# Patient Record
Sex: FEMALE | Race: WHITE | Employment: OTHER | URBAN - METROPOLITAN AREA
[De-identification: names, ages, dates, MRNs, and addresses within clinical notes are randomized per-mention and may not be internally consistent; named-entity substitution may affect disease eponyms.]

---

## 2017-06-06 ENCOUNTER — HOSPITAL ENCOUNTER (OUTPATIENT)
Dept: WOMENS IMAGING | Age: 77
Discharge: HOME | End: 2017-06-06
Attending: FAMILY MEDICINE | Admitting: FAMILY MEDICINE

## 2017-06-06 DIAGNOSIS — Z12.39 SCREENING BREAST EXAMINATION: ICD-10-CM

## 2018-05-16 ENCOUNTER — HOSPITAL ENCOUNTER (OUTPATIENT)
Dept: GENERAL RADIOLOGY | Age: 78
Discharge: HOME | End: 2018-05-16
Attending: FAMILY MEDICINE | Admitting: FAMILY MEDICINE

## 2018-05-16 DIAGNOSIS — M54.6 THORACIC BACK PAIN, UNSPECIFIED BACK PAIN LATERALITY, UNSPECIFIED CHRONICITY: ICD-10-CM

## 2018-10-09 ENCOUNTER — APPOINTMENT (OUTPATIENT)
Dept: GENERAL RADIOLOGY | Age: 78
End: 2018-10-09
Payer: COMMERCIAL

## 2018-10-09 ENCOUNTER — HOSPITAL ENCOUNTER (OUTPATIENT)
Age: 78
Setting detail: OBSERVATION
Discharge: NURSING, REHAB OR LONG-TERM CARE | End: 2018-10-11
Attending: EMERGENCY MEDICINE | Admitting: INTERNAL MEDICINE
Payer: COMMERCIAL

## 2018-10-09 ENCOUNTER — APPOINTMENT (OUTPATIENT)
Dept: CT IMAGING | Age: 78
End: 2018-10-09
Payer: COMMERCIAL

## 2018-10-09 DIAGNOSIS — R07.9 CHEST PAIN, UNSPECIFIED TYPE: Primary | ICD-10-CM

## 2018-10-09 DIAGNOSIS — M54.50 CHRONIC MIDLINE LOW BACK PAIN WITHOUT SCIATICA: ICD-10-CM

## 2018-10-09 DIAGNOSIS — G89.29 CHRONIC MIDLINE LOW BACK PAIN WITHOUT SCIATICA: ICD-10-CM

## 2018-10-09 LAB
ALBUMIN SERPL-MCNC: 3.8 GM/DL (ref 3.4–5)
ALP BLD-CCNC: 103 IU/L (ref 40–129)
ALT SERPL-CCNC: 13 U/L (ref 10–40)
ANION GAP SERPL CALCULATED.3IONS-SCNC: 11 MMOL/L (ref 4–16)
AST SERPL-CCNC: 18 IU/L (ref 15–37)
BASOPHILS ABSOLUTE: 0 K/CU MM
BASOPHILS RELATIVE PERCENT: 0.5 % (ref 0–1)
BILIRUB SERPL-MCNC: 0.4 MG/DL (ref 0–1)
BUN BLDV-MCNC: 17 MG/DL (ref 6–23)
CALCIUM SERPL-MCNC: 9.4 MG/DL (ref 8.3–10.6)
CHLORIDE BLD-SCNC: 104 MMOL/L (ref 99–110)
CO2: 26 MMOL/L (ref 21–32)
CREAT SERPL-MCNC: 0.7 MG/DL (ref 0.6–1.1)
DIFFERENTIAL TYPE: ABNORMAL
EOSINOPHILS ABSOLUTE: 0.1 K/CU MM
EOSINOPHILS RELATIVE PERCENT: 2.3 % (ref 0–3)
GFR AFRICAN AMERICAN: >60 ML/MIN/1.73M2
GFR NON-AFRICAN AMERICAN: >60 ML/MIN/1.73M2
GLUCOSE BLD-MCNC: 159 MG/DL (ref 70–99)
HCT VFR BLD CALC: 41.9 % (ref 37–47)
HEMOGLOBIN: 13.5 GM/DL (ref 12.5–16)
IMMATURE NEUTROPHIL %: 0.3 % (ref 0–0.43)
LYMPHOCYTES ABSOLUTE: 2.4 K/CU MM
LYMPHOCYTES RELATIVE PERCENT: 39 % (ref 24–44)
MCH RBC QN AUTO: 30.5 PG (ref 27–31)
MCHC RBC AUTO-ENTMCNC: 32.2 % (ref 32–36)
MCV RBC AUTO: 94.6 FL (ref 78–100)
MONOCYTES ABSOLUTE: 0.8 K/CU MM
MONOCYTES RELATIVE PERCENT: 12.7 % (ref 0–4)
NUCLEATED RBC %: 0 %
PDW BLD-RTO: 12.9 % (ref 11.7–14.9)
PLATELET # BLD: 208 K/CU MM (ref 140–440)
PMV BLD AUTO: 9.9 FL (ref 7.5–11.1)
POTASSIUM SERPL-SCNC: 3.6 MMOL/L (ref 3.5–5.1)
PRO-BNP: 150.4 PG/ML
RBC # BLD: 4.43 M/CU MM (ref 4.2–5.4)
SEGMENTED NEUTROPHILS ABSOLUTE COUNT: 2.7 K/CU MM
SEGMENTED NEUTROPHILS RELATIVE PERCENT: 45.2 % (ref 36–66)
SODIUM BLD-SCNC: 141 MMOL/L (ref 135–145)
TOTAL IMMATURE NEUTOROPHIL: 0.02 K/CU MM
TOTAL NUCLEATED RBC: 0 K/CU MM
TOTAL PROTEIN: 6.8 GM/DL (ref 6.4–8.2)
TROPONIN T: <0.01 NG/ML
WBC # BLD: 6.1 K/CU MM (ref 4–10.5)

## 2018-10-09 PROCEDURE — 6370000000 HC RX 637 (ALT 250 FOR IP): Performed by: EMERGENCY MEDICINE

## 2018-10-09 PROCEDURE — 96365 THER/PROPH/DIAG IV INF INIT: CPT

## 2018-10-09 PROCEDURE — 80053 COMPREHEN METABOLIC PANEL: CPT

## 2018-10-09 PROCEDURE — G0378 HOSPITAL OBSERVATION PER HR: HCPCS

## 2018-10-09 PROCEDURE — 99285 EMERGENCY DEPT VISIT HI MDM: CPT

## 2018-10-09 PROCEDURE — 71275 CT ANGIOGRAPHY CHEST: CPT

## 2018-10-09 PROCEDURE — 96372 THER/PROPH/DIAG INJ SC/IM: CPT

## 2018-10-09 PROCEDURE — 36415 COLL VENOUS BLD VENIPUNCTURE: CPT

## 2018-10-09 PROCEDURE — 2580000003 HC RX 258: Performed by: EMERGENCY MEDICINE

## 2018-10-09 PROCEDURE — 2580000003 HC RX 258: Performed by: INTERNAL MEDICINE

## 2018-10-09 PROCEDURE — 93010 ELECTROCARDIOGRAM REPORT: CPT | Performed by: INTERNAL MEDICINE

## 2018-10-09 PROCEDURE — 6370000000 HC RX 637 (ALT 250 FOR IP): Performed by: PHYSICIAN ASSISTANT

## 2018-10-09 PROCEDURE — 93005 ELECTROCARDIOGRAM TRACING: CPT | Performed by: PHYSICIAN ASSISTANT

## 2018-10-09 PROCEDURE — 85025 COMPLETE CBC W/AUTO DIFF WBC: CPT

## 2018-10-09 PROCEDURE — 71045 X-RAY EXAM CHEST 1 VIEW: CPT

## 2018-10-09 PROCEDURE — 84484 ASSAY OF TROPONIN QUANT: CPT

## 2018-10-09 PROCEDURE — 6360000002 HC RX W HCPCS: Performed by: INTERNAL MEDICINE

## 2018-10-09 PROCEDURE — 6370000000 HC RX 637 (ALT 250 FOR IP): Performed by: INTERNAL MEDICINE

## 2018-10-09 PROCEDURE — 6370000000 HC RX 637 (ALT 250 FOR IP): Performed by: HOSPITALIST

## 2018-10-09 PROCEDURE — 83880 ASSAY OF NATRIURETIC PEPTIDE: CPT

## 2018-10-09 PROCEDURE — 6360000004 HC RX CONTRAST MEDICATION: Performed by: EMERGENCY MEDICINE

## 2018-10-09 RX ORDER — OXYCODONE HYDROCHLORIDE AND ACETAMINOPHEN 5; 325 MG/1; MG/1
2 TABLET ORAL ONCE
Status: COMPLETED | OUTPATIENT
Start: 2018-10-09 | End: 2018-10-09

## 2018-10-09 RX ORDER — PRAVASTATIN SODIUM 20 MG
20 TABLET ORAL NIGHTLY
Status: DISCONTINUED | OUTPATIENT
Start: 2018-10-10 | End: 2018-10-11 | Stop reason: HOSPADM

## 2018-10-09 RX ORDER — OXYCODONE HYDROCHLORIDE AND ACETAMINOPHEN 5; 325 MG/1; MG/1
2 TABLET ORAL 4 TIMES DAILY PRN
Status: DISCONTINUED | OUTPATIENT
Start: 2018-10-09 | End: 2018-10-11 | Stop reason: HOSPADM

## 2018-10-09 RX ORDER — OMEPRAZOLE 40 MG/1
40 CAPSULE, DELAYED RELEASE ORAL 2 TIMES DAILY
COMMUNITY

## 2018-10-09 RX ORDER — TRIAMTERENE AND HYDROCHLOROTHIAZIDE 37.5; 25 MG/1; MG/1
1 TABLET ORAL DAILY
Status: DISCONTINUED | OUTPATIENT
Start: 2018-10-10 | End: 2018-10-11 | Stop reason: HOSPADM

## 2018-10-09 RX ORDER — OXYCODONE AND ACETAMINOPHEN 10; 325 MG/1; MG/1
1 TABLET ORAL 4 TIMES DAILY
Status: ON HOLD | COMMUNITY
End: 2018-10-11

## 2018-10-09 RX ORDER — SODIUM CHLORIDE 9 MG/ML
INJECTION, SOLUTION INTRAVENOUS CONTINUOUS
Status: DISCONTINUED | OUTPATIENT
Start: 2018-10-09 | End: 2018-10-10

## 2018-10-09 RX ORDER — 0.9 % SODIUM CHLORIDE 0.9 %
700 INTRAVENOUS SOLUTION INTRAVENOUS ONCE
Status: DISCONTINUED | OUTPATIENT
Start: 2018-10-09 | End: 2018-10-09

## 2018-10-09 RX ORDER — GABAPENTIN 300 MG/1
300 CAPSULE ORAL 3 TIMES DAILY
Status: DISCONTINUED | OUTPATIENT
Start: 2018-10-09 | End: 2018-10-11 | Stop reason: HOSPADM

## 2018-10-09 RX ORDER — AMLODIPINE BESYLATE 5 MG/1
5 TABLET ORAL DAILY
Status: DISCONTINUED | OUTPATIENT
Start: 2018-10-10 | End: 2018-10-11 | Stop reason: HOSPADM

## 2018-10-09 RX ORDER — OMEGA-3S/DHA/EPA/FISH OIL/D3 300MG-1000
400 CAPSULE ORAL DAILY
COMMUNITY
End: 2022-03-23

## 2018-10-09 RX ORDER — PANTOPRAZOLE SODIUM 40 MG/1
40 TABLET, DELAYED RELEASE ORAL
Status: DISCONTINUED | OUTPATIENT
Start: 2018-10-10 | End: 2018-10-11 | Stop reason: HOSPADM

## 2018-10-09 RX ORDER — LEVOTHYROXINE SODIUM 88 UG/1
88 TABLET ORAL DAILY
Status: DISCONTINUED | OUTPATIENT
Start: 2018-10-10 | End: 2018-10-11 | Stop reason: HOSPADM

## 2018-10-09 RX ORDER — LISINOPRIL 40 MG/1
40 TABLET ORAL DAILY
Status: DISCONTINUED | OUTPATIENT
Start: 2018-10-10 | End: 2018-10-11 | Stop reason: HOSPADM

## 2018-10-09 RX ORDER — ASPIRIN 81 MG/1
324 TABLET, CHEWABLE ORAL ONCE
Status: COMPLETED | OUTPATIENT
Start: 2018-10-09 | End: 2018-10-09

## 2018-10-09 RX ORDER — OMEGA-3S/DHA/EPA/FISH OIL/D3 300MG-1000
400 CAPSULE ORAL DAILY
Status: DISCONTINUED | OUTPATIENT
Start: 2018-10-10 | End: 2018-10-11 | Stop reason: HOSPADM

## 2018-10-09 RX ORDER — 0.9 % SODIUM CHLORIDE 0.9 %
10 VIAL (ML) INJECTION
Status: COMPLETED | OUTPATIENT
Start: 2018-10-09 | End: 2018-10-09

## 2018-10-09 RX ORDER — ZOLPIDEM TARTRATE 5 MG/1
5 TABLET ORAL NIGHTLY PRN
Status: DISCONTINUED | OUTPATIENT
Start: 2018-10-09 | End: 2018-10-11 | Stop reason: HOSPADM

## 2018-10-09 RX ORDER — LEVOFLOXACIN 5 MG/ML
500 INJECTION, SOLUTION INTRAVENOUS EVERY EVENING
Status: DISCONTINUED | OUTPATIENT
Start: 2018-10-09 | End: 2018-10-11 | Stop reason: HOSPADM

## 2018-10-09 RX ORDER — SERTRALINE HYDROCHLORIDE 100 MG/1
150 TABLET, FILM COATED ORAL DAILY
COMMUNITY

## 2018-10-09 RX ADMIN — SODIUM CHLORIDE 10 ML: 9 INJECTION, SOLUTION INTRAMUSCULAR; INTRAVENOUS; SUBCUTANEOUS at 13:59

## 2018-10-09 RX ADMIN — SODIUM CHLORIDE: 9 INJECTION, SOLUTION INTRAVENOUS at 20:27

## 2018-10-09 RX ADMIN — GABAPENTIN 300 MG: 300 CAPSULE ORAL at 20:26

## 2018-10-09 RX ADMIN — LEVOFLOXACIN 500 MG: 5 INJECTION, SOLUTION INTRAVENOUS at 20:27

## 2018-10-09 RX ADMIN — IOPAMIDOL 80 ML: 755 INJECTION, SOLUTION INTRAVENOUS at 13:58

## 2018-10-09 RX ADMIN — OXYCODONE HYDROCHLORIDE AND ACETAMINOPHEN 2 TABLET: 5; 325 TABLET ORAL at 20:26

## 2018-10-09 RX ADMIN — ASPIRIN 81 MG CHEWABLE TABLET 324 MG: 81 TABLET CHEWABLE at 12:20

## 2018-10-09 RX ADMIN — ZOLPIDEM TARTRATE 5 MG: 5 TABLET ORAL at 20:26

## 2018-10-09 RX ADMIN — ENOXAPARIN SODIUM 30 MG: 30 INJECTION SUBCUTANEOUS at 20:27

## 2018-10-09 RX ADMIN — OXYCODONE AND ACETAMINOPHEN 2 TABLET: 5; 325 TABLET ORAL at 15:35

## 2018-10-09 ASSESSMENT — PAIN SCALES - GENERAL
PAINLEVEL_OUTOF10: 6
PAINLEVEL_OUTOF10: 7
PAINLEVEL_OUTOF10: 6

## 2018-10-09 ASSESSMENT — PAIN DESCRIPTION - PAIN TYPE: TYPE: ACUTE PAIN

## 2018-10-09 ASSESSMENT — PAIN DESCRIPTION - LOCATION: LOCATION: CHEST;NECK

## 2018-10-09 ASSESSMENT — PAIN - FUNCTIONAL ASSESSMENT: PAIN_FUNCTIONAL_ASSESSMENT: 0-10

## 2018-10-09 ASSESSMENT — HEART SCORE: ECG: 0

## 2018-10-09 ASSESSMENT — PAIN DESCRIPTION - FREQUENCY: FREQUENCY: INTERMITTENT

## 2018-10-09 ASSESSMENT — PAIN DESCRIPTION - DESCRIPTORS: DESCRIPTORS: SPASM;SHARP

## 2018-10-09 NOTE — ED PROVIDER NOTES
eMERGENCY dEPARTMENT eNCOUnter        PCP: Lore Appiah MD    [facility]    Chief Complaint   Patient presents with    Chest Pain       HPI      Kurt Bryan is a 70 y.o. female with PMH of PE, DVT, HTN who presents with chest pain. Onset - 3 days ago  Location - Left side of chest  Duration - intermittent  Character - tightness, pins and needles  Aggravating/Alleviating factors - aggravating factor is exertion and deep breaths. Alleviating factors are denied. Activity at onset - 3 days ago began after eating a fish Friday, but has occurred with exertion since  Associate symptoms - shortness of breath, diaphoresis  Radiation - left side of neck  Severity - 6/10 when occurring    Patient reports that she has had great difficulty walking up a ramp due to chest pain and shortness of breath. Patient reports family history of CAD and reports that her brother  of a massive heart attack at age 37. Patient reports cardiologist is Dr. Barbra Gaspar but has not seen him in a long time. Patient denies nausea, vomiting, abdominal pain, palpitations, syncope, lower extremity swelling, hemoptysis, cough. REVIEW OF SYSTEMS    Constitutional:  Denies fever, chills. HENT:  Denies sore throat or ear pain   Cardiovascular:  +Chest Pain; Denies palpitations,  Denies syncope, no extremity edema.   Respiratory:  See HPI  GI:  Denies abdominal pain, nausea, vomiting, or diarrhea  :  Denies any urinary symptoms  Musculoskeletal:  Denies back pain, no extremity swelling  Skin:  Denies rash  Neurologic:  Denies lightheadedness, dizziness, headache, focal weakness or sensory changes   Endocrine:  Denies polyuria or polydypsia   Lymphatic:  Denies swollen glands     All other review of systems are negative  See HPI and nursing notes for additional information         PAST MEDICAL & SURGICAL HISTORY    Past Medical History:   Diagnosis Date    Cancer ([facility] [address]     [facility]     H/O echocardiogram

## 2018-10-09 NOTE — PROGRESS NOTES
change, interval change, formulation change):  · Omeprazole new medication  · Sertraline new medication  · Amlodipine dose changed to 5 mg from 10 mg  · Levothyroxine dose changed to 88 mcg from 75 mcg  · Percocet dose changed to  mg from 5-325 mg    Medications removed from list (include reason, ex. noncompliance, medication cost, therapy complete etc.):   · Etodolac no longer taking    Other Comments:  · Reviewed and updated medication list per patient and insurance claims  · Patient states she took all of her medications this morning and asking for pain medications because she is in a lot of pain    To my knowledge the above medication history is accurate as of 10/9/2018 2:43 PM.   Sergio Thompson CPhT   10/9/2018 2:43 PM

## 2018-10-09 NOTE — H&P
Smoking status: Former Smoker     Packs/day: 3.00     Years: 20.00     Quit date: 3/8/1994    Smokeless tobacco: Never Used    Alcohol use No    Drug use: No    Sexual activity: Not Asked     Other Topics Concern    None     Social History Narrative    None       MEDICATIONS   Medications Prior to Admission  Not in a hospital admission. Current Medications  No current facility-administered medications for this encounter. Current Outpatient Prescriptions   Medication Sig Dispense Refill    vitamin D3 (CHOLECALCIFEROL) 400 units TABS tablet Take 400 Units by mouth daily      omeprazole (PRILOSEC) 40 MG delayed release capsule Take 40 mg by mouth 2 times daily      oxyCODONE-acetaminophen (PERCOCET)  MG per tablet Take 1 tablet by mouth 4 times daily. Lynnda Solano TURMERIC PO Take 1 each by mouth daily      sertraline (ZOLOFT) 100 MG tablet Take 150 mg by mouth daily      Omega-3 Fatty Acids (FISH OIL) 1000 MG CAPS Take 3,000 mg by mouth 3 times daily      triamterene-hydrochlorothiazide (MAXZIDE-25) 37.5-25 MG per tablet Take 1 tablet by mouth daily      pravastatin (PRAVACHOL) 20 MG tablet Take 20 mg by mouth daily      gabapentin (NEURONTIN) 300 MG capsule Take 300 mg by mouth 3 times daily      benazepril (LOTENSIN) 40 MG tablet Take 40 mg by mouth daily      levothyroxine (SYNTHROID) 88 MCG tablet Take 88 mcg by mouth Daily       amLODIPine (NORVASC) 5 MG tablet   Take 5 mg by mouth daily            Allergies  Allergies   Allergen Reactions    Adhesive Tape Other (See Comments)     Sores and blisters    Demerol Hcl [Meperidine] Hives    Naproxen Hives       REVIEW OF SYSTEMS   Within above limitations. 14 point review of systems reviewed. Pertinent positive or negative as per HPI or otherwise negative per 14 point systems review.       PHYSICAL EXAM     Wt Readings from Last 3 Encounters:   10/09/18 240 lb (108.9 kg)   07/02/18 245 lb (111.1 kg)   11/14/16 247 lb (112 kg)       Blood pressure (!) 146/82, pulse 70, temperature 98.4 °F (36.9 °C), temperature source Oral, resp. rate 12, height 5' (1.524 m), weight 240 lb (108.9 kg), SpO2 94 %, not currently breastfeeding. General - AAO x 3  Psych - Appropriate affect/speech. No agitation  Eyes - Eye lids intact. No scleral icterus  ENT - Lips wnl. External ear clear/dry/intact. No thyromegaly on inspection  Neuro - No gross peripheral or central neuro deficits on inspection  Heart - Sinus. RRR. S1 and S2 present. No added HS/murmurs appreciated. No elevated JVD appreciated  Lung - Adequate air entry b/l, No crackles/wheezes appreciated  GI - Soft. No guarding/rigidity. No hepatosplenomegaly/ascites. BS+   - No CVA/suprapubic tenderness or palpable bladder distension  Skin - Intact. No rash/petechiae/ecchymosis. Warm extremities  MSK - Joints with normal ROM. No joint swellings    Lines/Drains/Airways/Wounds:  [unfilled]    LABS AND IMAGING   CBC  [unfilled]    Last 3 Hemoglobin  Lab Results   Component Value Date    HGB 13.5 10/09/2018    HGB 13.2 06/19/2015    HGB 12.0 06/18/2015     Last 3 WBC/ANC  Lab Results   Component Value Date    WBC 6.1 10/09/2018    WBC 5.2 06/19/2015    WBC 6.5 06/18/2015     No components found for: GRNLOCTYABS  Last 3 Platelets  No results found for: PLATELET  Chemistry  [unfilled]  [unfilled]  No results found for: LDH  Coagulation Studies  Lab Results   Component Value Date    INR 1.76 04/12/2013     Liver Function Studies  Lab Results   Component Value Date    ALT 13 10/09/2018    AST 18 10/09/2018    ALKPHOS 103 10/09/2018       Recent Imaging    Radha Thompson #[id number] ([facility]:[id number])  (78 y.o.  F)  (Adm: 10/09/18)     [address], Susan. \Hospital Has a New Name and Outlook.\"" [id number]         Imaging Results (last 7 days)          Procedure Component Value Ref Range Date/Time     CTA PULMONARY W CONTRAST [[id number]] Collected: 10/09/18 1404     Order Status: Completed Updated: 10/09/18 1434     Narrative:       EXAMINATION:  CTA OF THE CHEST 10/9/2018 1:59 pm    TECHNIQUE:  CTA of the chest was performed after the administration of intravenous  contrast.  Multiplanar reformatted images are provided for review.  MIP  images are provided for review. Dose modulation, iterative reconstruction,  and/or weight based adjustment of the mA/kV was utilized to reduce the  radiation dose to as low as reasonably achievable. COMPARISON:  None. HISTORY:  ORDERING SYSTEM PROVIDED HISTORY: CHEST WALL PAIN  TECHNOLOGIST PROVIDED HISTORY:  PE protocol please. Ordering Physician Provided Reason for Exam: Chest wall pain; hurts with deep  brerath. hx PE,  Acuity: Acute  Type of Exam: Initial  Additional signs and symptoms: 80ml isovue 370  Relevant Medical/Surgical History: hx PE, lucrecia, hyster    FINDINGS:  Pulmonary Arteries: Pulmonary arteries are adequately opacified for  evaluation.  No evidence of intraluminal filling defect to suggest pulmonary  embolism.  Main pulmonary artery is normal in caliber. Mediastinum: No evidence of mediastinal lymphadenopathy.  Heart is not  enlarged, but a small pericardial effusion is seen. Lamont Pock is no acute  abnormality of the thoracic aorta. Lungs/pleura: Left lower lobe atelectatic and/or consolidative changes are  seen.  Pneumonia is possible.  No evidence of pleural effusion or  pneumothorax. Upper Abdomen: Limited images of the upper abdomen are unremarkable. Soft Tissues/Bones: Severe multilevel degenerative disc disease.   Redemonstration of compression injury at T11.     Impression:       No evidence of pulmonary embolism or acute aortic disease.  Left lower lobe  atelectasis/consolidation which may represent pneumonia.     XR CHEST PORTABLE [[id number]] Collected: 10/09/18 1211     Order Status: Completed Updated: 10/09/18 1215     Narrative:       EXAMINATION:  SINGLE XRAY VIEW OF THE CHEST    10/9/2018 10:24 am    COMPARISON:  12/01/2016    HISTORY:  ORDERING SYSTEM PROVIDED HISTORY: Chest

## 2018-10-10 ENCOUNTER — APPOINTMENT (OUTPATIENT)
Dept: CT IMAGING | Age: 78
End: 2018-10-10
Payer: COMMERCIAL

## 2018-10-10 ENCOUNTER — APPOINTMENT (OUTPATIENT)
Dept: NUCLEAR MEDICINE | Age: 78
End: 2018-10-10
Payer: COMMERCIAL

## 2018-10-10 LAB
ANION GAP SERPL CALCULATED.3IONS-SCNC: 10 MMOL/L (ref 4–16)
BASOPHILS ABSOLUTE: 0 K/CU MM
BASOPHILS RELATIVE PERCENT: 0.6 % (ref 0–1)
BUN BLDV-MCNC: 13 MG/DL (ref 6–23)
CALCIUM SERPL-MCNC: 9 MG/DL (ref 8.3–10.6)
CHLORIDE BLD-SCNC: 104 MMOL/L (ref 99–110)
CO2: 27 MMOL/L (ref 21–32)
CREAT SERPL-MCNC: 0.7 MG/DL (ref 0.6–1.1)
DIFFERENTIAL TYPE: ABNORMAL
EOSINOPHILS ABSOLUTE: 0.2 K/CU MM
EOSINOPHILS RELATIVE PERCENT: 3 % (ref 0–3)
GFR AFRICAN AMERICAN: >60 ML/MIN/1.73M2
GFR NON-AFRICAN AMERICAN: >60 ML/MIN/1.73M2
GLUCOSE BLD-MCNC: 107 MG/DL (ref 70–99)
HCT VFR BLD CALC: 39.7 % (ref 37–47)
HEMOGLOBIN: 12.6 GM/DL (ref 12.5–16)
IMMATURE NEUTROPHIL %: 0.4 % (ref 0–0.43)
LEGIONELLA URINARY AG: NEGATIVE
LV EF: 70 %
LVEF MODALITY: NORMAL
LYMPHOCYTES ABSOLUTE: 1.7 K/CU MM
LYMPHOCYTES RELATIVE PERCENT: 33.3 % (ref 24–44)
MCH RBC QN AUTO: 29.8 PG (ref 27–31)
MCHC RBC AUTO-ENTMCNC: 31.7 % (ref 32–36)
MCV RBC AUTO: 93.9 FL (ref 78–100)
MONOCYTES ABSOLUTE: 0.6 K/CU MM
MONOCYTES RELATIVE PERCENT: 11.8 % (ref 0–4)
NUCLEATED RBC %: 0 %
PDW BLD-RTO: 12.7 % (ref 11.7–14.9)
PLATELET # BLD: 180 K/CU MM (ref 140–440)
PMV BLD AUTO: 9.7 FL (ref 7.5–11.1)
POTASSIUM SERPL-SCNC: 4 MMOL/L (ref 3.5–5.1)
RBC # BLD: 4.23 M/CU MM (ref 4.2–5.4)
SEGMENTED NEUTROPHILS ABSOLUTE COUNT: 2.6 K/CU MM
SEGMENTED NEUTROPHILS RELATIVE PERCENT: 50.9 % (ref 36–66)
SODIUM BLD-SCNC: 141 MMOL/L (ref 135–145)
STREP PNEUMONIAE ANTIGEN: NORMAL
TOTAL IMMATURE NEUTOROPHIL: 0.02 K/CU MM
TOTAL NUCLEATED RBC: 0 K/CU MM
TROPONIN T: <0.01 NG/ML
WBC # BLD: 5 K/CU MM (ref 4–10.5)

## 2018-10-10 PROCEDURE — G0378 HOSPITAL OBSERVATION PER HR: HCPCS

## 2018-10-10 PROCEDURE — 2580000003 HC RX 258: Performed by: INTERNAL MEDICINE

## 2018-10-10 PROCEDURE — 87899 AGENT NOS ASSAY W/OPTIC: CPT

## 2018-10-10 PROCEDURE — 6370000000 HC RX 637 (ALT 250 FOR IP): Performed by: INTERNAL MEDICINE

## 2018-10-10 PROCEDURE — 87449 NOS EACH ORGANISM AG IA: CPT

## 2018-10-10 PROCEDURE — 6360000002 HC RX W HCPCS: Performed by: INTERNAL MEDICINE

## 2018-10-10 PROCEDURE — 78452 HT MUSCLE IMAGE SPECT MULT: CPT

## 2018-10-10 PROCEDURE — 93017 CV STRESS TEST TRACING ONLY: CPT

## 2018-10-10 PROCEDURE — 84484 ASSAY OF TROPONIN QUANT: CPT

## 2018-10-10 PROCEDURE — 36415 COLL VENOUS BLD VENIPUNCTURE: CPT

## 2018-10-10 PROCEDURE — 85025 COMPLETE CBC W/AUTO DIFF WBC: CPT

## 2018-10-10 PROCEDURE — 3430000000 HC RX DIAGNOSTIC RADIOPHARMACEUTICAL: Performed by: INTERNAL MEDICINE

## 2018-10-10 PROCEDURE — 96372 THER/PROPH/DIAG INJ SC/IM: CPT

## 2018-10-10 PROCEDURE — 80048 BASIC METABOLIC PNL TOTAL CA: CPT

## 2018-10-10 PROCEDURE — 72131 CT LUMBAR SPINE W/O DYE: CPT

## 2018-10-10 PROCEDURE — 96366 THER/PROPH/DIAG IV INF ADDON: CPT

## 2018-10-10 PROCEDURE — A9500 TC99M SESTAMIBI: HCPCS | Performed by: INTERNAL MEDICINE

## 2018-10-10 PROCEDURE — 72128 CT CHEST SPINE W/O DYE: CPT

## 2018-10-10 RX ORDER — TIZANIDINE 4 MG/1
4 TABLET ORAL 4 TIMES DAILY
Status: DISCONTINUED | OUTPATIENT
Start: 2018-10-10 | End: 2018-10-11 | Stop reason: HOSPADM

## 2018-10-10 RX ORDER — OXYCODONE HYDROCHLORIDE 10 MG/1
10 TABLET ORAL ONCE
Status: COMPLETED | OUTPATIENT
Start: 2018-10-10 | End: 2018-10-10

## 2018-10-10 RX ORDER — MORPHINE SULFATE 2 MG/ML
2 INJECTION, SOLUTION INTRAMUSCULAR; INTRAVENOUS EVERY 4 HOURS PRN
Status: DISCONTINUED | OUTPATIENT
Start: 2018-10-10 | End: 2018-10-11 | Stop reason: HOSPADM

## 2018-10-10 RX ADMIN — OXYCODONE HYDROCHLORIDE AND ACETAMINOPHEN 2 TABLET: 5; 325 TABLET ORAL at 20:20

## 2018-10-10 RX ADMIN — GABAPENTIN 300 MG: 300 CAPSULE ORAL at 20:20

## 2018-10-10 RX ADMIN — OXYCODONE HYDROCHLORIDE AND ACETAMINOPHEN 2 TABLET: 5; 325 TABLET ORAL at 04:39

## 2018-10-10 RX ADMIN — AMLODIPINE BESYLATE 5 MG: 5 TABLET ORAL at 10:32

## 2018-10-10 RX ADMIN — LEVOFLOXACIN 500 MG: 5 INJECTION, SOLUTION INTRAVENOUS at 18:24

## 2018-10-10 RX ADMIN — ENOXAPARIN SODIUM 30 MG: 30 INJECTION SUBCUTANEOUS at 18:23

## 2018-10-10 RX ADMIN — SERTRALINE HYDROCHLORIDE 150 MG: 100 TABLET ORAL at 10:31

## 2018-10-10 RX ADMIN — GABAPENTIN 300 MG: 300 CAPSULE ORAL at 10:32

## 2018-10-10 RX ADMIN — OXYCODONE HYDROCHLORIDE AND ACETAMINOPHEN 2 TABLET: 5; 325 TABLET ORAL at 14:40

## 2018-10-10 RX ADMIN — TIZANIDINE 4 MG: 4 TABLET ORAL at 16:22

## 2018-10-10 RX ADMIN — Medication 10 MILLICURIE: at 07:30

## 2018-10-10 RX ADMIN — Medication 30 MILLICURIE: at 10:19

## 2018-10-10 RX ADMIN — PRAVASTATIN SODIUM 20 MG: 20 TABLET ORAL at 23:39

## 2018-10-10 RX ADMIN — TRIAMTERENE AND HYDROCHLOROTHIAZIDE 1 TABLET: 37.5; 25 TABLET ORAL at 10:31

## 2018-10-10 RX ADMIN — SODIUM CHLORIDE: 9 INJECTION, SOLUTION INTRAVENOUS at 07:06

## 2018-10-10 RX ADMIN — REGADENOSON 0.4 MG: 0.08 INJECTION, SOLUTION INTRAVENOUS at 09:42

## 2018-10-10 RX ADMIN — TIZANIDINE 4 MG: 4 TABLET ORAL at 20:20

## 2018-10-10 RX ADMIN — GABAPENTIN 300 MG: 300 CAPSULE ORAL at 14:41

## 2018-10-10 RX ADMIN — OXYCODONE HYDROCHLORIDE AND ACETAMINOPHEN 2 TABLET: 5; 325 TABLET ORAL at 10:32

## 2018-10-10 RX ADMIN — LISINOPRIL 40 MG: 40 TABLET ORAL at 10:32

## 2018-10-10 RX ADMIN — OXYCODONE HYDROCHLORIDE 10 MG: 10 TABLET ORAL at 16:22

## 2018-10-10 ASSESSMENT — PAIN SCALES - GENERAL
PAINLEVEL_OUTOF10: 7
PAINLEVEL_OUTOF10: 0
PAINLEVEL_OUTOF10: 8
PAINLEVEL_OUTOF10: 0
PAINLEVEL_OUTOF10: 7
PAINLEVEL_OUTOF10: 0
PAINLEVEL_OUTOF10: 8
PAINLEVEL_OUTOF10: 9

## 2018-10-10 ASSESSMENT — PAIN DESCRIPTION - PROGRESSION: CLINICAL_PROGRESSION: GRADUALLY WORSENING

## 2018-10-10 ASSESSMENT — PAIN DESCRIPTION - ORIENTATION
ORIENTATION: LOWER
ORIENTATION: LOWER;MID

## 2018-10-10 ASSESSMENT — PAIN DESCRIPTION - PAIN TYPE
TYPE: CHRONIC PAIN
TYPE: CHRONIC PAIN

## 2018-10-10 ASSESSMENT — PAIN DESCRIPTION - FREQUENCY
FREQUENCY: INTERMITTENT
FREQUENCY: CONTINUOUS

## 2018-10-10 ASSESSMENT — PAIN DESCRIPTION - LOCATION
LOCATION: BACK
LOCATION: BACK

## 2018-10-10 ASSESSMENT — PAIN DESCRIPTION - DESCRIPTORS
DESCRIPTORS: DISCOMFORT
DESCRIPTORS: ACHING;CONSTANT

## 2018-10-10 ASSESSMENT — PAIN DESCRIPTION - ONSET
ONSET: ON-GOING
ONSET: ON-GOING

## 2018-10-11 VITALS
DIASTOLIC BLOOD PRESSURE: 54 MMHG | OXYGEN SATURATION: 91 % | HEART RATE: 61 BPM | BODY MASS INDEX: 48.1 KG/M2 | RESPIRATION RATE: 16 BRPM | HEIGHT: 60 IN | SYSTOLIC BLOOD PRESSURE: 129 MMHG | TEMPERATURE: 98.5 F | WEIGHT: 245 LBS

## 2018-10-11 LAB
ANION GAP SERPL CALCULATED.3IONS-SCNC: 9 MMOL/L (ref 4–16)
BASOPHILS ABSOLUTE: 0 K/CU MM
BASOPHILS RELATIVE PERCENT: 0.3 % (ref 0–1)
BUN BLDV-MCNC: 12 MG/DL (ref 6–23)
CALCIUM SERPL-MCNC: 9.5 MG/DL (ref 8.3–10.6)
CHLORIDE BLD-SCNC: 101 MMOL/L (ref 99–110)
CO2: 30 MMOL/L (ref 21–32)
CREAT SERPL-MCNC: 0.7 MG/DL (ref 0.6–1.1)
DIFFERENTIAL TYPE: ABNORMAL
EOSINOPHILS ABSOLUTE: 0.2 K/CU MM
EOSINOPHILS RELATIVE PERCENT: 2.8 % (ref 0–3)
GFR AFRICAN AMERICAN: >60 ML/MIN/1.73M2
GFR NON-AFRICAN AMERICAN: >60 ML/MIN/1.73M2
GLUCOSE BLD-MCNC: 104 MG/DL (ref 70–99)
HCT VFR BLD CALC: 40.6 % (ref 37–47)
HEMOGLOBIN: 13.1 GM/DL (ref 12.5–16)
IMMATURE NEUTROPHIL %: 0.2 % (ref 0–0.43)
LYMPHOCYTES ABSOLUTE: 1.9 K/CU MM
LYMPHOCYTES RELATIVE PERCENT: 32.9 % (ref 24–44)
MCH RBC QN AUTO: 30.4 PG (ref 27–31)
MCHC RBC AUTO-ENTMCNC: 32.3 % (ref 32–36)
MCV RBC AUTO: 94.2 FL (ref 78–100)
MONOCYTES ABSOLUTE: 0.6 K/CU MM
MONOCYTES RELATIVE PERCENT: 10.3 % (ref 0–4)
NUCLEATED RBC %: 0 %
PDW BLD-RTO: 12.7 % (ref 11.7–14.9)
PLATELET # BLD: 177 K/CU MM (ref 140–440)
PMV BLD AUTO: 9.7 FL (ref 7.5–11.1)
POTASSIUM SERPL-SCNC: 4.2 MMOL/L (ref 3.5–5.1)
RBC # BLD: 4.31 M/CU MM (ref 4.2–5.4)
SEGMENTED NEUTROPHILS ABSOLUTE COUNT: 3.1 K/CU MM
SEGMENTED NEUTROPHILS RELATIVE PERCENT: 53.5 % (ref 36–66)
SODIUM BLD-SCNC: 140 MMOL/L (ref 135–145)
TOTAL IMMATURE NEUTOROPHIL: 0.01 K/CU MM
TOTAL NUCLEATED RBC: 0 K/CU MM
TOTAL RETICULOCYTE COUNT: 0.09 K/CU MM
WBC # BLD: 5.8 K/CU MM (ref 4–10.5)

## 2018-10-11 PROCEDURE — G8979 MOBILITY GOAL STATUS: HCPCS

## 2018-10-11 PROCEDURE — 97530 THERAPEUTIC ACTIVITIES: CPT

## 2018-10-11 PROCEDURE — 36415 COLL VENOUS BLD VENIPUNCTURE: CPT

## 2018-10-11 PROCEDURE — 97116 GAIT TRAINING THERAPY: CPT

## 2018-10-11 PROCEDURE — G0378 HOSPITAL OBSERVATION PER HR: HCPCS

## 2018-10-11 PROCEDURE — 80048 BASIC METABOLIC PNL TOTAL CA: CPT

## 2018-10-11 PROCEDURE — 6370000000 HC RX 637 (ALT 250 FOR IP): Performed by: INTERNAL MEDICINE

## 2018-10-11 PROCEDURE — 97162 PT EVAL MOD COMPLEX 30 MIN: CPT

## 2018-10-11 PROCEDURE — 85025 COMPLETE CBC W/AUTO DIFF WBC: CPT

## 2018-10-11 PROCEDURE — 6370000000 HC RX 637 (ALT 250 FOR IP): Performed by: HOSPITALIST

## 2018-10-11 PROCEDURE — G8978 MOBILITY CURRENT STATUS: HCPCS

## 2018-10-11 RX ORDER — TIZANIDINE 4 MG/1
4 TABLET ORAL 4 TIMES DAILY
Qty: 28 TABLET | Refills: 0 | Status: CANCELLED | OUTPATIENT
Start: 2018-10-11 | End: 2018-10-18

## 2018-10-11 RX ORDER — OXYCODONE AND ACETAMINOPHEN 10; 325 MG/1; MG/1
1 TABLET ORAL 4 TIMES DAILY
Qty: 8 TABLET | Refills: 0 | Status: CANCELLED | OUTPATIENT
Start: 2018-10-11 | End: 2018-10-13

## 2018-10-11 RX ORDER — DOCUSATE SODIUM 100 MG/1
100 CAPSULE, LIQUID FILLED ORAL 2 TIMES DAILY
Qty: 60 CAPSULE | Refills: 0 | Status: SHIPPED | OUTPATIENT
Start: 2018-10-11 | End: 2022-01-03

## 2018-10-11 RX ORDER — SENNOSIDES 8.6 MG
2 CAPSULE ORAL 2 TIMES DAILY
Qty: 120 CAPSULE | Refills: 0 | Status: SHIPPED | OUTPATIENT
Start: 2018-10-11 | End: 2022-01-03

## 2018-10-11 RX ORDER — TIZANIDINE 4 MG/1
4 TABLET ORAL 4 TIMES DAILY
Qty: 28 TABLET | Refills: 0 | Status: SHIPPED | OUTPATIENT
Start: 2018-10-11 | End: 2018-10-18

## 2018-10-11 RX ORDER — LEVOFLOXACIN 750 MG/1
750 TABLET ORAL DAILY
Qty: 5 TABLET | Refills: 0 | Status: SHIPPED | OUTPATIENT
Start: 2018-10-11 | End: 2018-10-16

## 2018-10-11 RX ORDER — OXYCODONE AND ACETAMINOPHEN 10; 325 MG/1; MG/1
1 TABLET ORAL 4 TIMES DAILY
Qty: 8 TABLET | Refills: 0 | Status: SHIPPED | OUTPATIENT
Start: 2018-10-11 | End: 2018-10-13

## 2018-10-11 RX ADMIN — GABAPENTIN 300 MG: 300 CAPSULE ORAL at 14:03

## 2018-10-11 RX ADMIN — LEVOTHYROXINE SODIUM 88 MCG: 88 TABLET ORAL at 07:51

## 2018-10-11 RX ADMIN — TRIAMTERENE AND HYDROCHLOROTHIAZIDE 1 TABLET: 37.5; 25 TABLET ORAL at 10:33

## 2018-10-11 RX ADMIN — SERTRALINE HYDROCHLORIDE 150 MG: 100 TABLET ORAL at 10:33

## 2018-10-11 RX ADMIN — ZOLPIDEM TARTRATE 5 MG: 5 TABLET ORAL at 02:09

## 2018-10-11 RX ADMIN — OXYCODONE HYDROCHLORIDE AND ACETAMINOPHEN 2 TABLET: 5; 325 TABLET ORAL at 02:09

## 2018-10-11 RX ADMIN — TIZANIDINE 4 MG: 4 TABLET ORAL at 10:33

## 2018-10-11 RX ADMIN — LISINOPRIL 40 MG: 40 TABLET ORAL at 10:33

## 2018-10-11 RX ADMIN — GABAPENTIN 300 MG: 300 CAPSULE ORAL at 10:33

## 2018-10-11 RX ADMIN — Medication 400 UNITS: at 10:36

## 2018-10-11 RX ADMIN — AMLODIPINE BESYLATE 5 MG: 5 TABLET ORAL at 10:28

## 2018-10-11 RX ADMIN — PANTOPRAZOLE SODIUM 40 MG: 40 TABLET, DELAYED RELEASE ORAL at 07:51

## 2018-10-11 RX ADMIN — TIZANIDINE 4 MG: 4 TABLET ORAL at 14:04

## 2018-10-11 ASSESSMENT — PAIN SCALES - GENERAL
PAINLEVEL_OUTOF10: 0
PAINLEVEL_OUTOF10: 0
PAINLEVEL_OUTOF10: 5
PAINLEVEL_OUTOF10: 7
PAINLEVEL_OUTOF10: 0

## 2018-10-11 ASSESSMENT — PAIN DESCRIPTION - ORIENTATION: ORIENTATION: LOWER

## 2018-10-11 ASSESSMENT — PAIN DESCRIPTION - FREQUENCY: FREQUENCY: INTERMITTENT

## 2018-10-11 ASSESSMENT — PAIN DESCRIPTION - PAIN TYPE: TYPE: CHRONIC PAIN

## 2018-10-11 ASSESSMENT — PAIN DESCRIPTION - LOCATION: LOCATION: BACK

## 2018-10-11 ASSESSMENT — PAIN DESCRIPTION - ONSET: ONSET: ON-GOING

## 2018-10-11 ASSESSMENT — PAIN DESCRIPTION - DESCRIPTORS: DESCRIPTORS: DISCOMFORT

## 2018-10-12 LAB
EKG ATRIAL RATE: 77 BPM
EKG DIAGNOSIS: NORMAL
EKG P AXIS: 20 DEGREES
EKG P-R INTERVAL: 164 MS
EKG Q-T INTERVAL: 392 MS
EKG QRS DURATION: 88 MS
EKG QTC CALCULATION (BAZETT): 443 MS
EKG R AXIS: -51 DEGREES
EKG T AXIS: 50 DEGREES
EKG VENTRICULAR RATE: 77 BPM

## 2018-10-17 ENCOUNTER — HOSPITAL ENCOUNTER (OUTPATIENT)
Age: 78
Setting detail: SPECIMEN
Discharge: HOME | End: 2018-10-17

## 2018-10-17 LAB
ALBUMIN SERPL-MCNC: 3.7 GM/DL (ref 3.4–5)
ALP BLD-CCNC: 192 IU/L (ref 40–129)
ALT SERPL-CCNC: 57 U/L (ref 10–40)
ANION GAP SERPL CALCULATED.3IONS-SCNC: 11 MMOL/L (ref 4–16)
AST SERPL-CCNC: 108 IU/L (ref 15–37)
BILIRUB SERPL-MCNC: 0.5 MG/DL (ref 0–1)
BUN BLDV-MCNC: 22 MG/DL (ref 6–23)
CALCIUM SERPL-MCNC: 9.3 MG/DL (ref 8.3–10.6)
CHLORIDE BLD-SCNC: 98 MMOL/L (ref 99–110)
CO2: 30 MMOL/L (ref 21–32)
CREAT SERPL-MCNC: 1 MG/DL (ref 0.6–1.1)
GFR AFRICAN AMERICAN: >60 ML/MIN/1.73M2
GFR NON-AFRICAN AMERICAN: 54 ML/MIN/1.73M2
GLUCOSE BLD-MCNC: 114 MG/DL (ref 70–99)
HCT VFR BLD CALC: 40.9 % (ref 37–47)
HEMOGLOBIN: 12.8 GM/DL (ref 12.5–16)
MCH RBC QN AUTO: 30.5 PG (ref 27–31)
MCHC RBC AUTO-ENTMCNC: 31.3 % (ref 32–36)
MCV RBC AUTO: 97.6 FL (ref 78–100)
PDW BLD-RTO: 13.2 % (ref 11.7–14.9)
PLATELET # BLD: 199 K/CU MM (ref 140–440)
PMV BLD AUTO: 10.5 FL (ref 7.5–11.1)
POTASSIUM SERPL-SCNC: 4.1 MMOL/L (ref 3.5–5.1)
RBC # BLD: 4.19 M/CU MM (ref 4.2–5.4)
SODIUM BLD-SCNC: 139 MMOL/L (ref 135–145)
TOTAL PROTEIN: 6.1 GM/DL (ref 6.4–8.2)
WBC # BLD: 6.3 K/CU MM (ref 4–10.5)

## 2018-10-17 PROCEDURE — 36415 COLL VENOUS BLD VENIPUNCTURE: CPT

## 2018-10-17 PROCEDURE — 85027 COMPLETE CBC AUTOMATED: CPT

## 2018-10-17 PROCEDURE — 80053 COMPREHEN METABOLIC PANEL: CPT

## 2018-10-19 ENCOUNTER — HOSPITAL ENCOUNTER (OUTPATIENT)
Age: 78
Setting detail: SPECIMEN
Discharge: HOME | End: 2018-10-19

## 2018-10-19 LAB
ALBUMIN SERPL-MCNC: 3.6 GM/DL (ref 3.4–5)
ALP BLD-CCNC: 144 IU/L (ref 40–128)
ALT SERPL-CCNC: 29 U/L (ref 10–40)
ANION GAP SERPL CALCULATED.3IONS-SCNC: 11 MMOL/L (ref 4–16)
AST SERPL-CCNC: 30 IU/L (ref 15–37)
BILIRUB SERPL-MCNC: 0.6 MG/DL (ref 0–1)
BUN BLDV-MCNC: 12 MG/DL (ref 6–23)
CALCIUM SERPL-MCNC: 9.2 MG/DL (ref 8.3–10.6)
CHLORIDE BLD-SCNC: 100 MMOL/L (ref 99–110)
CO2: 29 MMOL/L (ref 21–32)
CREAT SERPL-MCNC: 0.7 MG/DL (ref 0.6–1.1)
GFR AFRICAN AMERICAN: >60 ML/MIN/1.73M2
GFR NON-AFRICAN AMERICAN: >60 ML/MIN/1.73M2
GLUCOSE BLD-MCNC: 157 MG/DL (ref 70–99)
POTASSIUM SERPL-SCNC: 3.8 MMOL/L (ref 3.5–5.1)
SODIUM BLD-SCNC: 140 MMOL/L (ref 135–145)
TOTAL PROTEIN: 5.7 GM/DL (ref 6.4–8.2)

## 2018-10-19 PROCEDURE — 80053 COMPREHEN METABOLIC PANEL: CPT

## 2018-10-19 PROCEDURE — 36415 COLL VENOUS BLD VENIPUNCTURE: CPT

## 2018-10-26 ENCOUNTER — HOSPITAL ENCOUNTER (OUTPATIENT)
Age: 78
Setting detail: SPECIMEN
Discharge: HOME | End: 2018-10-26

## 2018-10-26 LAB
ALBUMIN SERPL-MCNC: 3.6 GM/DL (ref 3.4–5)
ALP BLD-CCNC: 85 IU/L (ref 40–129)
ALT SERPL-CCNC: 11 U/L (ref 10–40)
ANION GAP SERPL CALCULATED.3IONS-SCNC: 11 MMOL/L (ref 4–16)
AST SERPL-CCNC: 15 IU/L (ref 15–37)
BILIRUB SERPL-MCNC: 0.6 MG/DL (ref 0–1)
BUN BLDV-MCNC: 15 MG/DL (ref 6–23)
CALCIUM SERPL-MCNC: 9 MG/DL (ref 8.3–10.6)
CHLORIDE BLD-SCNC: 103 MMOL/L (ref 99–110)
CO2: 28 MMOL/L (ref 21–32)
CREAT SERPL-MCNC: 0.7 MG/DL (ref 0.6–1.1)
GFR AFRICAN AMERICAN: >60 ML/MIN/1.73M2
GFR NON-AFRICAN AMERICAN: >60 ML/MIN/1.73M2
GLUCOSE BLD-MCNC: 109 MG/DL (ref 70–99)
HCT VFR BLD CALC: 37.9 % (ref 37–47)
HEMOGLOBIN: 11.9 GM/DL (ref 12.5–16)
MCH RBC QN AUTO: 30.4 PG (ref 27–31)
MCHC RBC AUTO-ENTMCNC: 31.4 % (ref 32–36)
MCV RBC AUTO: 96.7 FL (ref 78–100)
PDW BLD-RTO: 13 % (ref 11.7–14.9)
PLATELET # BLD: 177 K/CU MM (ref 140–440)
PMV BLD AUTO: 10.2 FL (ref 7.5–11.1)
POTASSIUM SERPL-SCNC: 3.5 MMOL/L (ref 3.5–5.1)
RBC # BLD: 3.92 M/CU MM (ref 4.2–5.4)
SODIUM BLD-SCNC: 142 MMOL/L (ref 135–145)
TOTAL PROTEIN: 6.1 GM/DL (ref 6.4–8.2)
TSH HIGH SENSITIVITY: 2.61 UIU/ML (ref 0.27–4.2)
WBC # BLD: 5.3 K/CU MM (ref 4–10.5)

## 2018-10-26 PROCEDURE — 36415 COLL VENOUS BLD VENIPUNCTURE: CPT

## 2018-10-26 PROCEDURE — 84443 ASSAY THYROID STIM HORMONE: CPT

## 2018-10-26 PROCEDURE — 80053 COMPREHEN METABOLIC PANEL: CPT

## 2018-10-26 PROCEDURE — 85027 COMPLETE CBC AUTOMATED: CPT

## 2018-11-07 ENCOUNTER — HOSPITAL ENCOUNTER (OUTPATIENT)
Dept: GENERAL RADIOLOGY | Age: 78
Discharge: HOME | End: 2018-11-07
Payer: COMMERCIAL

## 2018-11-07 ENCOUNTER — HOSPITAL ENCOUNTER (OUTPATIENT)
Age: 78
Discharge: HOME | End: 2018-11-07
Payer: COMMERCIAL

## 2018-11-07 DIAGNOSIS — J44.9 OBSTRUCTIVE CHRONIC BRONCHITIS WITHOUT EXACERBATION (HCC): ICD-10-CM

## 2018-11-07 DIAGNOSIS — R07.9 CHEST PAIN, UNSPECIFIED TYPE: ICD-10-CM

## 2018-11-07 PROCEDURE — 71046 X-RAY EXAM CHEST 2 VIEWS: CPT

## 2019-10-16 ENCOUNTER — HOSPITAL ENCOUNTER (OUTPATIENT)
Dept: WOMENS IMAGING | Age: 79
Discharge: HOME | End: 2019-10-16
Payer: COMMERCIAL

## 2019-10-16 DIAGNOSIS — Z12.31 ENCOUNTER FOR SCREENING MAMMOGRAM FOR BREAST CANCER: ICD-10-CM

## 2019-10-16 DIAGNOSIS — Z78.0 POSTMENOPAUSAL: ICD-10-CM

## 2019-10-16 PROCEDURE — 77080 DXA BONE DENSITY AXIAL: CPT

## 2019-10-16 PROCEDURE — 77067 SCR MAMMO BI INCL CAD: CPT

## 2020-04-01 ENCOUNTER — HOSPITAL ENCOUNTER (OUTPATIENT)
Age: 80
Setting detail: SPECIMEN
Discharge: HOME | End: 2020-04-01
Payer: COMMERCIAL

## 2020-04-01 ENCOUNTER — OFFICE VISIT (OUTPATIENT)
Dept: INFECTIOUS DISEASES | Age: 80
End: 2020-04-01
Payer: COMMERCIAL

## 2020-04-01 VITALS
BODY MASS INDEX: 49.02 KG/M2 | TEMPERATURE: 97.8 F | RESPIRATION RATE: 18 BRPM | WEIGHT: 251 LBS | DIASTOLIC BLOOD PRESSURE: 90 MMHG | HEART RATE: 80 BPM | SYSTOLIC BLOOD PRESSURE: 148 MMHG

## 2020-04-01 PROBLEM — L97.912 ULCER OF RIGHT LOWER EXTREMITY WITH FAT LAYER EXPOSED (HCC): Status: ACTIVE | Noted: 2020-04-01

## 2020-04-01 PROCEDURE — 87073 CULTURE BACTERIA ANAEROBIC: CPT

## 2020-04-01 PROCEDURE — 87071 CULTURE AEROBIC QUANT OTHER: CPT

## 2020-04-01 PROCEDURE — 99204 OFFICE O/P NEW MOD 45 MIN: CPT | Performed by: INTERNAL MEDICINE

## 2020-04-01 RX ORDER — OXYCODONE HYDROCHLORIDE AND ACETAMINOPHEN 325; 5 MG/5ML; MG/5ML
10 SOLUTION ORAL EVERY 4 HOURS PRN
COMMUNITY
End: 2022-01-03

## 2020-04-01 RX ORDER — LINEZOLID 600 MG/1
600 TABLET, FILM COATED ORAL 2 TIMES DAILY
Qty: 28 TABLET | Refills: 0 | Status: SHIPPED | OUTPATIENT
Start: 2020-04-01 | End: 2020-04-15

## 2020-04-01 NOTE — PROGRESS NOTES
pupils equal and reactive, extra ocular muscles intact, oropharynx clear, mucus membranes moist, tympanic membranes clear bilaterally, no cervical lymphadenopathy noted and neck supple  Neck: supple, no significant adenopathy  Chest: clear to auscultation, no wheezes, rales or rhonchi, symmetric air entry  Heart: regular rate and rhythm, no murmurs  ABD: abdomen is soft without significant tenderness, masses, organomegaly or guarding. EXT:peripheral pulses normal, no pedal edema, no clubbing or cyanosis  NEURO: alert, oriented, normal speech, no focal findings or movement disorder noted  Skin: well hydrated, no lesions, surgical site examined  Wounds: right leg lower third, medial aspect, ulcer with rolled up edges.    Labs:   WBC   Date Value Ref Range Status   10/26/2018 5.3 4.0 - 10.5 K/CU MM Final   10/17/2018 6.3 4.0 - 10.5 K/CU MM Final   10/11/2018 5.8 4.0 - 10.5 K/CU MM Final     CREATININE   Date Value Ref Range Status   10/26/2018 0.7 0.6 - 1.1 MG/DL Final   10/19/2018 0.7 0.6 - 1.1 MG/DL Final   10/17/2018 1.0 0.6 - 1.1 MG/DL Final       Cultures:  Culture   Date Value Ref Range Status   04/01/2020   Final    NO GROWTH AT 48 HOURS NO ANAEROBIC GROWTH AT 72 HOURS   06/18/2015 KLEBSIELLA PNEUMONIAE HEAVY GROWTH  Final   06/18/2015       PROTEUS SPECIES SCANTY GROWTH, PROBABLE CONTAMINANT       Imaging Studies:   none      Electronicallysigned by Maribel Aldridge MD on 4/1/20 at 10:40 AM EDT

## 2020-04-01 NOTE — ASSESSMENT & PLAN NOTE
Skin excision in January 2020, yet to heal and has an area of redness around it. Suspect a low virulent microbe, or may be persistent of neoplasm (not proven). Wound cx.  Give linezolid and see in 2 weeks

## 2020-04-03 LAB
BASOPHILS ABSOLUTE: 0 /ΜL
BASOPHILS RELATIVE PERCENT: 0.7 %
EOSINOPHILS ABSOLUTE: 0.1 /ΜL
EOSINOPHILS RELATIVE PERCENT: 1.6 %
HCT VFR BLD CALC: 43.2 % (ref 36–46)
HEMOGLOBIN: 14.4 G/DL (ref 12–16)
LYMPHOCYTES ABSOLUTE: 2.2 /ΜL
LYMPHOCYTES RELATIVE PERCENT: 36.8 %
MCH RBC QN AUTO: 29.7 PG
MCHC RBC AUTO-ENTMCNC: 33.4 G/DL
MCV RBC AUTO: 89.1 FL
MONOCYTES ABSOLUTE: 0.8 /ΜL
MONOCYTES RELATIVE PERCENT: 13.9 %
NEUTROPHILS ABSOLUTE: 2.9 /ΜL
NEUTROPHILS RELATIVE PERCENT: 47 %
PDW BLD-RTO: 13.4 %
PLATELET # BLD: 215 K/ΜL
PMV BLD AUTO: 0 FL
RBC # BLD: 4.85 10^6/ΜL
WBC # BLD: 6.1 10^3/ML

## 2020-04-05 LAB
CULTURE: NORMAL
Lab: NORMAL
SPECIMEN: NORMAL

## 2020-05-06 ENCOUNTER — OFFICE VISIT (OUTPATIENT)
Dept: INFECTIOUS DISEASES | Age: 80
End: 2020-05-06
Payer: COMMERCIAL

## 2020-05-06 VITALS
WEIGHT: 251 LBS | BODY MASS INDEX: 49.02 KG/M2 | DIASTOLIC BLOOD PRESSURE: 66 MMHG | HEART RATE: 94 BPM | TEMPERATURE: 98.8 F | SYSTOLIC BLOOD PRESSURE: 144 MMHG

## 2020-05-06 PROCEDURE — 99213 OFFICE O/P EST LOW 20 MIN: CPT | Performed by: INTERNAL MEDICINE

## 2022-01-03 ENCOUNTER — INITIAL CONSULT (OUTPATIENT)
Dept: ONCOLOGY | Age: 82
End: 2022-01-03
Payer: COMMERCIAL

## 2022-01-03 ENCOUNTER — HOSPITAL ENCOUNTER (OUTPATIENT)
Dept: INFUSION THERAPY | Age: 82
Discharge: HOME | End: 2022-01-03
Payer: COMMERCIAL

## 2022-01-03 VITALS
DIASTOLIC BLOOD PRESSURE: 63 MMHG | RESPIRATION RATE: 18 BRPM | SYSTOLIC BLOOD PRESSURE: 135 MMHG | OXYGEN SATURATION: 92 % | BODY MASS INDEX: 46.53 KG/M2 | WEIGHT: 237 LBS | HEIGHT: 60 IN | HEART RATE: 79 BPM | TEMPERATURE: 96.8 F

## 2022-01-03 DIAGNOSIS — C20 RECTAL CANCER (HCC): ICD-10-CM

## 2022-01-03 LAB
ALBUMIN SERPL-MCNC: 4.1 GM/DL (ref 3.4–5)
ALP BLD-CCNC: 152 IU/L (ref 40–128)
ALT SERPL-CCNC: 11 U/L (ref 10–40)
ANION GAP SERPL CALCULATED.3IONS-SCNC: 11 MMOL/L (ref 4–16)
AST SERPL-CCNC: 21 IU/L (ref 15–37)
BILIRUB SERPL-MCNC: 0.4 MG/DL (ref 0–1)
BUN BLDV-MCNC: 15 MG/DL (ref 6–23)
CALCIUM SERPL-MCNC: 9.5 MG/DL (ref 8.3–10.6)
CHLORIDE BLD-SCNC: 101 MMOL/L (ref 99–110)
CO2: 26 MMOL/L (ref 21–32)
CREAT SERPL-MCNC: 0.6 MG/DL (ref 0.6–1.1)
DIFFERENTIAL TYPE: ABNORMAL
EOSINOPHILS ABSOLUTE: 0.2 K/CU MM
EOSINOPHILS RELATIVE PERCENT: 3 % (ref 0–3)
GFR AFRICAN AMERICAN: >60 ML/MIN/1.73M2
GFR NON-AFRICAN AMERICAN: >60 ML/MIN/1.73M2
GLUCOSE BLD-MCNC: 106 MG/DL (ref 70–99)
HCT VFR BLD CALC: 41.9 % (ref 37–47)
HEMOGLOBIN: 13.6 GM/DL (ref 12.5–16)
LYMPHOCYTES ABSOLUTE: 2.4 K/CU MM
LYMPHOCYTES RELATIVE PERCENT: 33 % (ref 24–44)
MCH RBC QN AUTO: 29 PG (ref 27–31)
MCHC RBC AUTO-ENTMCNC: 32.5 % (ref 32–36)
MCV RBC AUTO: 89.3 FL (ref 78–100)
MONOCYTES ABSOLUTE: 1.2 K/CU MM
MONOCYTES RELATIVE PERCENT: 16 % (ref 0–4)
PDW BLD-RTO: 14 % (ref 11.7–14.9)
PLATELET # BLD: 193 K/CU MM (ref 140–440)
PMV BLD AUTO: 10.3 FL (ref 7.5–11.1)
POTASSIUM SERPL-SCNC: 4.1 MMOL/L (ref 3.5–5.1)
RBC # BLD: 4.69 M/CU MM (ref 4.2–5.4)
SEGMENTED NEUTROPHILS ABSOLUTE COUNT: 3.6 K/CU MM
SEGMENTED NEUTROPHILS RELATIVE PERCENT: 48 % (ref 36–66)
SODIUM BLD-SCNC: 138 MMOL/L (ref 135–145)
TOTAL PROTEIN: 6.8 GM/DL (ref 6.4–8.2)
WBC # BLD: 7.4 K/CU MM (ref 4–10.5)

## 2022-01-03 PROCEDURE — 99205 OFFICE O/P NEW HI 60 MIN: CPT | Performed by: INTERNAL MEDICINE

## 2022-01-03 PROCEDURE — 85027 COMPLETE CBC AUTOMATED: CPT

## 2022-01-03 PROCEDURE — 82378 CARCINOEMBRYONIC ANTIGEN: CPT

## 2022-01-03 PROCEDURE — 80053 COMPREHEN METABOLIC PANEL: CPT

## 2022-01-03 PROCEDURE — 99202 OFFICE O/P NEW SF 15 MIN: CPT

## 2022-01-03 PROCEDURE — 85007 BL SMEAR W/DIFF WBC COUNT: CPT

## 2022-01-03 PROCEDURE — 36415 COLL VENOUS BLD VENIPUNCTURE: CPT

## 2022-01-03 ASSESSMENT — PATIENT HEALTH QUESTIONNAIRE - PHQ9
2. FEELING DOWN, DEPRESSED OR HOPELESS: 0
SUM OF ALL RESPONSES TO PHQ QUESTIONS 1-9: 1
SUM OF ALL RESPONSES TO PHQ QUESTIONS 1-9: 1
1. LITTLE INTEREST OR PLEASURE IN DOING THINGS: 1
SUM OF ALL RESPONSES TO PHQ QUESTIONS 1-9: 1
SUM OF ALL RESPONSES TO PHQ QUESTIONS 1-9: 1
SUM OF ALL RESPONSES TO PHQ9 QUESTIONS 1 & 2: 1

## 2022-01-03 NOTE — LETTER
[address] [place] [address]  Phone: [phone]  Fax: [phone]           Burke Hernandez MD      January 3, 2022     Patient: Tammy Boston   MR Number: [id number]   YOB: 1940   Date of Visit: 1/3/2022       Dear Dr. Ernestina Andersen: Thank you for referring Donny Caraballo to me for evaluation/treatment. Below are the relevant portions of my assessment and plan of care. If you have questions, please do not hesitate to call me. I look forward to following Breana Barrett along with you.     Sincerely,        Burke Hernandez MD     providers:  Cem Matta MD  [address] [place] [address]  Via Fax: [phone]

## 2022-01-03 NOTE — PROGRESS NOTES
MA Rooming Questions  Patient: Cha Boyle  MRN: [id number]    Date: 1/3/2022        1. Do you have any new issues? no     NP  5. Did the patient have a depression screening completed today?  Yes    PHQ-9 Total Score: 1 (1/3/2022  9:56 AM)       PHQ-9 Given to (if applicable):               PHQ-9 Score (if applicable):                     [] Positive     [x]  Negative              Does question #9 need addressed (if applicable)                     [] Yes    []  No               Sahil Briggs CMA

## 2022-01-03 NOTE — PROGRESS NOTES
Patient Name:  Nils Cordero  Patient :  1940  Patient MRN:  [id number]     Primary Oncologist: Mehran Hardin MD  Referring Provider: Marija Hernandez MD     Date of Service: 1/3/2022      Reason for Consult: To evaluate the patient with stage IIIC rectal adenocarcinoma. Chief Complaint:    Chief Complaint   Patient presents with    New Patient     Patient Active Problem List:     Hypertension     Obesity     Shortness of breath     Depression     Orthostatic hypotension     Abdominal pain     Anxiety     Chest pain     Ulcer of right lower extremity with fat layer exposed ([address]    HPI:   Nils Cordero is a 81-year-old very pleasant female with medical history significant for hypertension, hyperlipidemia, diabetes mellitus, obesity, COPD, pulmonary hypertension, history is of DVT and PE, referred to me on 1/3/2022 for evaluation of her newly diagnosed stage IIIC rectal cancer. She stated that she was initially evaluated by Dr. Be Ortega for newly diagnosed tubular adenoma with at least high-grade dysplasia. She was hospitalized on  for abdominal pain. 2021: CT abdomen pelvis: Changes from cholecystectomy. Common bile duct significantly dilated, mild intrahepatic biliary ductal dilatation.      2021: MRI/MRCP: Diffuse biliary ductal dilatation extending to the ampulla, no choledocholithiasis or mass and could represent post cholecystectomy changes. 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 Left renal angiomyolipoma.      09/15/2021: Colonoscopy: Rectosigmoid ulcerated flat mass located approximately 4-5 cm from the dentate line, measuring about 2.5 cm friable, no active bleeding. Biopsies taken and tattoo placed distally.  Full colonoscopy completed     Pathology revealed at least high grade dysplasia arising in a tubular adenoma, suspicious for invasion into the lamina propria.      10/25/2021: EUS: A 4 cm posterior rectal wall mass, raised and friable, the mass was mildly ulcerated. Mass appears to involve the submucosa with areas of muscularis propria involvement, consistent with T2, although there were a couple of small focal areas that are concerning but not definitive for invasion through the muscularis propria which raised concern for focal T3 disease. Located about 5 cm from the anal verge on the posterior rectal wall. No obvious lymphadenopathy was noted. EUS staging: T2-T3, N0     Pathology: Invasive moderately differentiated colonic adenocarcinoma     Since she was found to have clinical T2 rectal cancer, she underwent robotic low anterior resection and permanent colostomy placement on 12/6/2021.      Final pathology was consistent with stage IIIC rectal adenocarcinoma. She was subsequently referred to me to discuss about adjuvant chemotherapy + chemoradiation therapy. She is recovering well from surgery and she doesn't have any significant symptoms at today visit. Past Medical History:     Significant for  1. Hypertension  2. Hyperlipidemia  3. Diabetes mellitus  4. Obesity  5. History of DVT and pulmonary embolism  6. COPD  7. Pulmonary hypertension    Past Surgery History:    Significant for  1. Cholecystectomy  2. Bilateral knee replacement  3. Tonsillectomy  4. Back surgery x 3  5. Bilateral arm surgery  6. Partial hysterectomy  7. Bilateral foot surgery  8. Low anterior resection for rectal cancer and permanent colostomy placement on 12/6/2021    Social History:   She is a former smoker and she quit smoking in 1994. She used to smoke 2 packs a day for approximately 22 years. She denies alcohol drinking or illicit drug abuse.     Family History:    Significant for colon cancer in one of her brother, lung cancer in one of her brother, kidney cancer in another brother and throat cancer in her another brother. Allergies   Allergen Reactions    Latex      blister    Demerol Hcl [Meperidine] Hives    Adhesive Tape Other (See Comments)     Sores and blisters    Oxycodone-Acetaminophen     Metoprolol Rash     Other reaction(s): rash    Naproxen Hives and Rash     Other reaction(s): rash         Current Outpatient Medications on File Prior to Visit   Medication Sig Dispense Refill    oxyCODONE HCl (ROXICODONE PO) Take 10 mg by mouth      omeprazole (PRILOSEC) 40 MG delayed release capsule Take 40 mg by mouth 2 times daily      sertraline (ZOLOFT) 100 MG tablet Take 150 mg by mouth daily      Omega-3 Fatty Acids (FISH OIL) 1000 MG CAPS Take 3,000 mg by mouth 3 times daily      triamterene-hydrochlorothiazide (MAXZIDE-25) 37.5-25 MG per tablet Take 1 tablet by mouth daily      pravastatin (PRAVACHOL) 20 MG tablet Take 20 mg by mouth daily      gabapentin (NEURONTIN) 300 MG capsule Take 300 mg by mouth 3 times daily      benazepril (LOTENSIN) 40 MG tablet Take 40 mg by mouth daily      levothyroxine (SYNTHROID) 88 MCG tablet Take 88 mcg by mouth Daily       amLODIPine (NORVASC) 5 MG tablet Take 10 mg by mouth daily       vitamin D3 (CHOLECALCIFEROL) 400 units TABS tablet Take 400 Units by mouth daily      TURMERIC PO Take 1 each by mouth daily       No current facility-administered medications on file prior to visit. Review of Systems:  Constitutional:  No weight loss, No fever, No chills, No night sweats. Energy level is okay. Eyes:  No diplopia, No transient or permanent loss of vision, No scotomata. ENT / Mouth:  No epistaxis, No dysphagia, No hoarseness, No oral ulcers, No gingival bleeding. No sore throat, No postnasal drip, No nasal drip, No mouth pain, No sinus pain, No tinnitus, Normal hearing.   Cardiovascular:  No chest pain, No palpitations, No syncope, No upper extremity edema, No lower extremity edema, No calf discomfort. Respiratory:  No cough. No hemoptysis, No pleurisy, No wheezing, No dyspnea. Breast:  No breast mass, No pain, No nipple discharge, No change in size, No change in shape. Gastrointestinal:  No abdominal pain, No abdominal cramping, No nausea, No vomiting, No constipation, No diarrhea, No hematochezia, No melena, No jaundice, No dyspepsia, No dysphagia. Urinary:  No dysuria, No hematuria, No urinary incontinence. Gynecological:  No vaginal discharge, No suprapubic pain, No abnormal vaginal bleeding. Musculoskeletal:  No muscle pain, No swollen joints, No joint redness, No bone pain, No spine tenderness. Skin:  No rash, No nodules, No pruritus, No lesions. Neurologic:  No confusion, No seizures, No syncope, No tremor, No speech change, No headache, No hiccups, No abnormal gait, No sensory changes, No weakness. Psychiatric:  No depression, No anxiety, Concentration normal.  Endocrine:  No polyuria, No polydipsia, No hot flashes, No thyroid symptoms. Hematologic:  No epistaxis, No gingival bleeding, No petechiae, No ecchymosis. Lymphatic:  No lymphadenopathy, No lymphedema. Allergy / Immunologic:  No eczema, No frequent mucous infections, No frequent respiratory infections, No recurrent urticarial, No frequent skin infections.      Vital Signs: /63 (Site: Left Lower Arm, Position: Sitting, Cuff Size: Large Adult)   Pulse 79   Temp 96.8 °F (36 °C) (Temporal)   Resp 18   Ht 5' (1.524 m)   Wt 237 lb (107.5 kg) Comment: pt stated weight  SpO2 92%   BMI 46.29 kg/m²      Physical Exam:  CONSTITUTIONAL: awake, alert, cooperative, no apparent distress   EYES: pupils equal, round and reactive to light, sclera clear, normal conjunctiva  ENT: Normocephalic, without obvious abnormality, atraumatic  NECK: supple, symmetrical, no jugular venous distension, no carotid bruits   HEMATOLOGIC/LYMPHATIC: no cervical, supraclavicular or axillary Component Value Date    PROT 6.8 01/03/2022     No results found for: Bharti Slope, KLFLCR  No results found for: B2M  Coagulation Panel:  Lab Results   Component Value Date    PROTIME 19.2 (H) 04/12/2013    INR 1.76 04/12/2013    APTT 34.8 03/29/2013     Anemia Panel:  Lab Results   Component Value Date    EPSIPWGY46 1060 (H) 03/09/2013    FOLATE >24.0 03/09/2013     Tumor Markers:  No results found for: , CEA, , LABCA2, PSA     Observations:  PHQ-9 Total Score: 1 (1/3/2022  9:56 AM)     Assessment   Stage IIIC rectal adenocarcinoma    Plan:                                                                                                Gianna Lanza is a 68-year-old very pleasant female who was initially evaluated by Dr. Jose Luis Jones for newly diagnosed tubular adenoma with at least high-grade dysplasia.      EUS on 10/25/2021 showed a 4 cm posterior rectal wall mass, raised and friable, the mass was mildly ulcerated. Mass appears to involve the submucosa with areas of muscularis propria involvement, consistent with T2, although there were a couple of small focal areas that are concerning but not definitive for invasion through the muscularis propria which raised concern for focal T3 disease. Located about 5 cm from the anal verge on the posterior rectal wall. No obvious lymphadenopathy was noted. EUS staging: T2-T3, N0     Pathology: Invasive moderately differentiated colonic adenocarcinoma     Since she was found to have clinical T2 rectal cancer, she underwent robotic low anterior resection and permanent colostomy placement on 12/6/2021.      Final pathology was consistent with stage IIIC rectal adenocarcinoma. Since she is found to have stage IIIC rectal adenocarcinoma, I recommend adjuvant chemotherapy with FOLFOX, followed by chemoradiation therapy with 5Fu. I also recommend her to have PET/CT scan to make sure that she doesn't have metastatic disease.  I will also check CEA level today.     She will let me know about her decision on chemotherapy followed by chemoradiation therapy on next office visit. I will see her back soon after PET/CT scan. I answered all her questions and concerns for today. I asked her to follow up with primary care physician on regular basis. I will continue to keep you updated on her progress. Thank you for allowing me to participate in the care of this very pleasant patient. Recent imaging and labs were reviewed and discussed with the patient.

## 2022-01-04 LAB — CEA: 2 NG/ML

## 2022-01-06 ENCOUNTER — TELEPHONE (OUTPATIENT)
Dept: ONCOLOGY | Age: 82
End: 2022-01-06

## 2022-01-06 NOTE — TELEPHONE ENCOUNTER
Pt is going to [facility] on 1/20 915 arrival for a 94 appt-- gave pet preo-- pt is aware of appt and stated understanding

## 2022-01-20 ENCOUNTER — HOSPITAL ENCOUNTER (OUTPATIENT)
Dept: PET IMAGING | Age: 82
Discharge: HOME | End: 2022-01-20
Payer: COMMERCIAL

## 2022-01-20 DIAGNOSIS — C20 RECTAL CANCER (HCC): ICD-10-CM

## 2022-01-20 PROCEDURE — 2580000003 HC RX 258: Performed by: INTERNAL MEDICINE

## 2022-01-20 PROCEDURE — 78815 PET IMAGE W/CT SKULL-THIGH: CPT

## 2022-01-20 PROCEDURE — 3430000000 HC RX DIAGNOSTIC RADIOPHARMACEUTICAL: Performed by: INTERNAL MEDICINE

## 2022-01-20 PROCEDURE — A9552 F18 FDG: HCPCS | Performed by: INTERNAL MEDICINE

## 2022-01-20 RX ORDER — FLUDEOXYGLUCOSE F 18 200 MCI/ML
13.85 INJECTION, SOLUTION INTRAVENOUS
Status: COMPLETED | OUTPATIENT
Start: 2022-01-20 | End: 2022-01-20

## 2022-01-20 RX ORDER — SODIUM CHLORIDE 0.9 % (FLUSH) 0.9 %
10 SYRINGE (ML) INJECTION PRN
Status: COMPLETED | OUTPATIENT
Start: 2022-01-20 | End: 2022-01-20

## 2022-01-20 RX ADMIN — FLUDEOXYGLUCOSE F 18 13.85 MILLICURIE: 200 INJECTION, SOLUTION INTRAVENOUS at 09:41

## 2022-01-20 RX ADMIN — SODIUM CHLORIDE, PRESERVATIVE FREE 10 ML: 5 INJECTION INTRAVENOUS at 09:41

## 2022-01-24 ENCOUNTER — TELEPHONE (OUTPATIENT)
Dept: ONCOLOGY | Age: 82
End: 2022-01-24

## 2022-01-24 ENCOUNTER — HOSPITAL ENCOUNTER (OUTPATIENT)
Dept: INFUSION THERAPY | Age: 82
Discharge: HOME | End: 2022-01-24

## 2022-01-24 ENCOUNTER — OFFICE VISIT (OUTPATIENT)
Dept: ONCOLOGY | Age: 82
End: 2022-01-24
Payer: COMMERCIAL

## 2022-01-24 VITALS
HEART RATE: 72 BPM | DIASTOLIC BLOOD PRESSURE: 66 MMHG | HEIGHT: 60 IN | BODY MASS INDEX: 46.29 KG/M2 | OXYGEN SATURATION: 92 % | RESPIRATION RATE: 18 BRPM | TEMPERATURE: 97.6 F | SYSTOLIC BLOOD PRESSURE: 141 MMHG

## 2022-01-24 DIAGNOSIS — C20 RECTAL CANCER (HCC): Primary | ICD-10-CM

## 2022-01-24 PROCEDURE — 99214 OFFICE O/P EST MOD 30 MIN: CPT | Performed by: INTERNAL MEDICINE

## 2022-01-24 NOTE — PROGRESS NOTES
Patient Name:  Alfonso Cruz  Patient :  1940  Patient MRN:  [id number]     Primary Oncologist: Lashawn Iverson MD  Referring Provider: Fatimah Barksdale MD     Date of Service: 2022      Reason for Consult: To evaluate the patient with stage IIIC rectal adenocarcinoma. Chief Complaint:    Chief Complaint   Patient presents with    New Patient     Patient Active Problem List:     Hypertension     Obesity     Shortness of breath     Depression     Orthostatic hypotension     Abdominal pain     Anxiety     Chest pain     Ulcer of right lower extremity with fat layer exposed ([address]    HPI:   Alfonso Cruz is a 74-year-old very pleasant female with medical history significant for hypertension, hyperlipidemia, diabetes mellitus, obesity, COPD, pulmonary hypertension, history is of DVT and PE, referred to me on 1/3/2022 for evaluation of her newly diagnosed stage IIIC rectal cancer. She stated that she was initially evaluated by Dr. Haywood Primrose for newly diagnosed tubular adenoma with at least high-grade dysplasia. She was hospitalized on  for abdominal pain. 2021: CT abdomen pelvis: Changes from cholecystectomy. Common bile duct significantly dilated, mild intrahepatic biliary ductal dilatation.      2021: MRI/MRCP: Diffuse biliary ductal dilatation extending to the ampulla, no choledocholithiasis or mass and could represent post cholecystectomy changes. 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 Left renal angiomyolipoma.      09/15/2021: Colonoscopy: Rectosigmoid ulcerated flat mass located approximately 4-5 cm from the dentate line, measuring about 2.5 cm friable, no active bleeding. Biopsies taken and tattoo placed distally.  Full colonoscopy completed     Pathology revealed at least high grade dysplasia arising in a tubular adenoma, suspicious for invasion into the lamina propria.      10/25/2021: EUS: A 4 cm posterior rectal wall mass, raised and friable, the mass was mildly ulcerated. Mass appears to involve the submucosa with areas of muscularis propria involvement, consistent with T2, although there were a couple of small focal areas that are concerning but not definitive for invasion through the muscularis propria which raised concern for focal T3 disease. Located about 5 cm from the anal verge on the posterior rectal wall. No obvious lymphadenopathy was noted. EUS staging: T2-T3, N0     Pathology: Invasive moderately differentiated colonic adenocarcinoma     Since she was found to have clinical T2 rectal cancer, she underwent robotic low anterior resection and permanent colostomy placement on 12/6/2021.      Final pathology was consistent with stage IIIC rectal adenocarcinoma. She was subsequently referred to me for further management. PET CT scan done on 1/20/2022 showed intense uptake at T10 associated with a lytic lesion, suspicious for potential osseous meta stasis. Intense uptake in the anorectal region likely due to recent surgery. On January 24, 2022, she presented to me for follow-up. I have been following her for stage IIIc rectal adenocarcinoma and she is status post robotic low anterior resection and permanent colostomy placement on 12/6/2021. On today visit, I reviewed with her findings on PET CT scan and we looked at her PET CT scan together. Her PET CT scan findings are concerning for metastatic disease at T10 vertebral.  I recommend her to consider for CT-guided biopsy. After long discussion with her and her daughter, she is in agreement to have CT-guided biopsy as soon as possible. She is also agreed to have Chemo-Port placement. I will set up for biopsy and port placement as soon as possible and I will see her again soon after that.   Further management will be based on pathology. She has mid back pain and stated that she had prior surgery for her back before. She doesn't have any other significant symptoms at today visit. Past Medical History:     Significant for  1. Hypertension  2. Hyperlipidemia  3. Diabetes mellitus  4. Obesity  5. History of DVT and pulmonary embolism  6. COPD  7. Pulmonary hypertension    Past Surgery History:    Significant for  1. Cholecystectomy  2. Bilateral knee replacement  3. Tonsillectomy  4. Back surgery x 3  5. Bilateral arm surgery  6. Partial hysterectomy  7. Bilateral foot surgery  8. Low anterior resection for rectal cancer and permanent colostomy placement on 12/6/2021    Social History:   She is a former smoker and she quit smoking in 1994. She used to smoke 2 packs a day for approximately 22 years. She denies alcohol drinking or illicit drug abuse. Family History:    Significant for colon cancer in one of her brother, lung cancer in one of her brother, kidney cancer in another brother and throat cancer in her another brother. Allergies:  Latex   Demerol  Adhesive tape  Percocet  Metoprolol  Naproxen                                                                                          Review of Systems: \"Per interval history; otherwise 10 point ROS is negative. \"  Her energy level is low and her sleep is stable. She denies fever, chills, night sweat, cough, shortness of breath, chest pain, hemoptysis or palpitations. Her bowel and bladder functions are normal. She doesn't have nausea, vomiting, abdominal pain, diarrhea, constipation, dysuria, loss of appetite or weight loss. She denies neuropathy and she doesn't have bleeding or clotting issues. She denies any pain on today visit. No anxiety or depression. The rest of the systems are unremarkable.      Vital Signs: BP (!) 141/66 (Site: Left Upper Arm, Position: Sitting, Cuff Size: Large Adult)   Pulse 72   Temp 97.6 °F (36.4 °C) (Temporal)   Resp 18   Ht 5' (1.524 m)   SpO2 92%   BMI 46.29 kg/m²      Physical Exam:  CONSTITUTIONAL: awake, alert, cooperative, no apparent distress   EYES: pupils equal, round and reactive to light, sclera clear, normal conjunctiva  ENT: Normocephalic, without obvious abnormality, atraumatic  NECK: supple, symmetrical, no jugular venous distension, no carotid bruits   HEMATOLOGIC/LYMPHATIC: no cervical, supraclavicular or axillary lymphadenopathy   LUNGS: VBS, no wheezes, clear to auscultation, no crackles, no increased work of breathing, no rhonchi,    CARDIOVASCULAR: regular rate and rhythm, normal S1 and S2, no murmur noted  ABDOMEN: normal bowel sounds x 4, soft, non-distended, non-tender, no masses palpated, no hepatosplenomegaly, left lower quadrant colostomy noted,   MUSCULOSKELETAL: full range of motion noted, tone is normal  NEUROLOGIC: awake, alert, oriented to name, place and time. Motor skills grossly intact. SKIN: appears intact, normal skin color, normal texture, normal turgor, no jaundice.    EXTREMITIES: no clubbing, no cyanosis, b/l LE edema +, b/l leg swelling +,      Labs:  Hematology:  Lab Results   Component Value Date    WBC 7.4 01/03/2022    RBC 4.69 01/03/2022    HGB 13.6 01/03/2022    HCT 41.9 01/03/2022    MCV 89.3 01/03/2022    MCH 29.0 01/03/2022    MCHC 32.5 01/03/2022    RDW 14.0 01/03/2022     01/03/2022    MPV 10.3 01/03/2022    SEGSPCT 48.0 01/03/2022    EOSRELPCT 3.0 01/03/2022    BASOPCT 0.7 04/03/2020    LYMPHOPCT 33.0 01/03/2022    MONOPCT 16.0 (H) 01/03/2022    SEGSABS 3.6 01/03/2022    EOSABS 0.2 01/03/2022    BASOSABS 0.0 04/03/2020    LYMPHSABS 2.4 01/03/2022    MONOSABS 1.2 01/03/2022    DIFFTYPE MANUAL DIFFERENTIAL 01/03/2022     No results found for: ESR  Chemistry:  Lab Results   Component Value Date     01/03/2022    K 4.1 01/03/2022     01/03/2022    CO2 26 01/03/2022    BUN 15 01/03/2022    CREATININE 0.6 01/03/2022    GLUCOSE 106 (H) 01/03/2022    CALCIUM 9.5 permanent colostomy placement on 12/6/2021.      Final pathology was consistent with stage IIIC rectal adenocarcinoma. PET CT scan done on 1/20/2022 showed intense uptake at T10 associated with a lytic lesion, suspicious for potential osseous meta stasis. Intense uptake in the anorectal region likely due to recent surgery. On January 24, 2022, she presented to me for follow-up. I have been following her for stage IIIc rectal adenocarcinoma and she is status post robotic low anterior resection and permanent colostomy placement on 12/6/2021. On today visit, I reviewed with her findings on PET CT scan and we looked at her PET CT scan together. Her PET CT scan findings are concerning for metastatic disease at T10 vertebral.  I recommend her to consider for CT-guided biopsy. After long discussion with her and her daughter, she is in agreement to have CT-guided biopsy as soon as possible. She is also agreed to have Chemo-Port placement. I will set up for biopsy and port placement as soon as possible and I will see her again soon after that. Further management will be based on pathology. I answered all her questions and concerns for today. I asked her to follow up with primary care physician on regular basis. Recent imaging and labs were reviewed and discussed with the patient.

## 2022-01-24 NOTE — PROGRESS NOTES
MA Rooming Questions  Patient: Jewels Moss  MRN: [id number]    Date: 1/24/2022        1. Do you have any new issues?   no         2. Do you need any refills on medications?    no    3. Have you had any imaging done since your last visit? yes - PET    4. Have you been hospitalized or seen in the emergency room since your last visit here?   no    5. Did the patient have a depression screening completed today?  No    No data recorded     PHQ-9 Given to (if applicable):               PHQ-9 Score (if applicable):                     [] Positive     []  Negative              Does question #9 need addressed (if applicable)                     [] Yes    []  No               Sukhdev Lay CMA

## 2022-01-24 NOTE — TELEPHONE ENCOUNTER
Spoke to patients daughter upon check out. She asked how they went about applying for Financial Assistance as the patient had just been dx. Advised that there is a team of dedicated staff here who will begin the process of initiating tx. I introduced my self as the financial Navigator and advised the process. FN advised that once Dr. Chetan Trujillo puts in the orders, we will be in the stage of obtaining Prior authorization with the patients insurance. If it is approved we will then schedule chemo education as well as her 1st cycle dates for treatment. If it is denied we will file an appeal etc. I advised this process can sometimes take up to 2 weeks but that as soon as a decision is made by the insurance we will  move forward. I also explained that her regimen requires a pump which we will order from the  therefore result in separate billing, which will be coordinated with their office and the patient advised she will have to inquire with them about any F/A they may offer. I advised that I will look into any Financial Assistance that is available on my end and was given verbal consent to apply for any [employer] as they open to deter any possible delays. During Education I will discuss with the patient how to go about applying for [facility] and will assist in completing that process with them from start to finish. She expressed understanding, I advised we will be in touch.

## 2022-01-26 ENCOUNTER — TELEPHONE (OUTPATIENT)
Dept: ONCOLOGY | Age: 82
End: 2022-01-26

## 2022-01-26 DIAGNOSIS — Z11.52 ENCOUNTER FOR SCREENING FOR COVID-19: Primary | ICD-10-CM

## 2022-01-26 NOTE — TELEPHONE ENCOUNTER
Pt is going to [facility] on 1/27 1210 for covid-- appts are 2/1 1130 arrival for a 130 appt-- and biopsy is on 2/3 730 arrival for a 930 appt-- pt is aware of appts and stated understanding

## 2022-01-27 ENCOUNTER — HOSPITAL ENCOUNTER (OUTPATIENT)
Dept: LAB | Age: 82
Discharge: HOME | End: 2022-01-27
Payer: COMMERCIAL

## 2022-01-27 LAB
SARS-COV-2: NOT DETECTED
SOURCE: NORMAL

## 2022-01-27 PROCEDURE — U0005 INFEC AGEN DETEC AMPLI PROBE: HCPCS

## 2022-01-27 PROCEDURE — U0003 INFECTIOUS AGENT DETECTION BY NUCLEIC ACID (DNA OR RNA); SEVERE ACUTE RESPIRATORY SYNDROME CORONAVIRUS 2 (SARS-COV-2) (CORONAVIRUS DISEASE [COVID-19]), AMPLIFIED PROBE TECHNIQUE, MAKING USE OF HIGH THROUGHPUT TECHNOLOGIES AS DESCRIBED BY CMS-2020-01-R: HCPCS

## 2022-02-01 ENCOUNTER — CLINICAL DOCUMENTATION (OUTPATIENT)
Dept: ONCOLOGY | Age: 82
End: 2022-02-01

## 2022-02-01 ENCOUNTER — HOSPITAL ENCOUNTER (OUTPATIENT)
Dept: INTERVENTIONAL RADIOLOGY/VASCULAR | Age: 82
Discharge: HOME | End: 2022-02-01
Payer: COMMERCIAL

## 2022-02-01 ENCOUNTER — TELEPHONE (OUTPATIENT)
Dept: ONCOLOGY | Age: 82
End: 2022-02-01

## 2022-02-01 VITALS
WEIGHT: 234 LBS | HEART RATE: 74 BPM | DIASTOLIC BLOOD PRESSURE: 69 MMHG | HEIGHT: 60 IN | OXYGEN SATURATION: 92 % | SYSTOLIC BLOOD PRESSURE: 143 MMHG | BODY MASS INDEX: 45.94 KG/M2 | TEMPERATURE: 97 F | RESPIRATION RATE: 16 BRPM

## 2022-02-01 DIAGNOSIS — C20 MALIGNANT NEOPLASM OF RECTUM (HCC): ICD-10-CM

## 2022-02-01 LAB
APTT: 23.1 SECONDS (ref 25.1–37.1)
HCT VFR BLD CALC: 47.1 % (ref 37–47)
HEMOGLOBIN: 14.3 GM/DL (ref 12.5–16)
INR BLD: 0.91 INDEX
MCH RBC QN AUTO: 28.3 PG (ref 27–31)
MCHC RBC AUTO-ENTMCNC: 30.4 % (ref 32–36)
MCV RBC AUTO: 93.1 FL (ref 78–100)
PDW BLD-RTO: 13.2 % (ref 11.7–14.9)
PLATELET # BLD: 190 K/CU MM (ref 140–440)
PMV BLD AUTO: 10.4 FL (ref 7.5–11.1)
PROTHROMBIN TIME: 11.7 SECONDS (ref 11.7–14.5)
RBC # BLD: 5.06 M/CU MM (ref 4.2–5.4)
WBC # BLD: 6.2 K/CU MM (ref 4–10.5)

## 2022-02-01 PROCEDURE — 2709999900 HC NON-CHARGEABLE SUPPLY

## 2022-02-01 PROCEDURE — 85730 THROMBOPLASTIN TIME PARTIAL: CPT

## 2022-02-01 PROCEDURE — 85027 COMPLETE CBC AUTOMATED: CPT

## 2022-02-01 PROCEDURE — C1894 INTRO/SHEATH, NON-LASER: HCPCS

## 2022-02-01 PROCEDURE — 85610 PROTHROMBIN TIME: CPT

## 2022-02-01 PROCEDURE — 7100000010 HC PHASE II RECOVERY - FIRST 15 MIN

## 2022-02-01 PROCEDURE — 2580000003 HC RX 258: Performed by: RADIOLOGY

## 2022-02-01 RX ORDER — SODIUM CHLORIDE 0.9 % (FLUSH) 0.9 %
10 SYRINGE (ML) INJECTION PRN
Status: DISCONTINUED | OUTPATIENT
Start: 2022-02-01 | End: 2022-02-02 | Stop reason: HOSPADM

## 2022-02-01 RX ORDER — OXYCODONE AND ACETAMINOPHEN 10; 325 MG/1; MG/1
1 TABLET ORAL EVERY 4 HOURS PRN
COMMUNITY

## 2022-02-01 RX ORDER — SODIUM CHLORIDE 0.9 % (FLUSH) 0.9 %
10 SYRINGE (ML) INJECTION PRN
Status: CANCELLED | OUTPATIENT
Start: 2022-02-01

## 2022-02-01 RX ADMIN — SODIUM CHLORIDE, PRESERVATIVE FREE 10 ML: 5 INJECTION INTRAVENOUS at 12:24

## 2022-02-01 ASSESSMENT — PAIN DESCRIPTION - DESCRIPTORS: DESCRIPTORS: ACHING;DISCOMFORT

## 2022-02-01 ASSESSMENT — PAIN - FUNCTIONAL ASSESSMENT: PAIN_FUNCTIONAL_ASSESSMENT: 0-10

## 2022-02-01 NOTE — TELEPHONE ENCOUNTER
Dr. Madison Reed called regarding patient wanting anesthesia for port placement scheduled today. He asked if we could please do this port placement during patients scheduled biopsy this Thursday 2/3/22. I asked Dr. Jena Prado and he said yes, that would be fine. IR then back called saying that due to patient having anesthesia, an H&P would have to created by Dr. Jena Prado before this procedure. Dr. Jena Prado can you please create H&P for this patient today? Per my verbal conversation with Dr. Jena Prado, I called them back and asked what was needed beyond his not already in th patient note. She said she would call back to let me know if anesthesia would accept what is already there.

## 2022-02-01 NOTE — PROGRESS NOTES
Dr. Swapnil Forrest last [address] from 01/24/22 which would be within 30 days of procedure. Dr. Kathe Beard would need to update on day of bx and port placement. IR to notify [facility] office if anything additional needed.

## 2022-02-01 NOTE — TELEPHONE ENCOUNTER
Per Pari Penny in IR  patient is schedule for 2/3/2022 7:30 arrival for 9:30 procedure. Patient daughter Calderon Jones notified.

## 2022-02-01 NOTE — PROGRESS NOTES
IV removed, pt getting dressed w/ assistance from pt daughter, procedure not performed, needs rescheduled, pt and pt daughter aware.  1443- pt discharged

## 2022-02-08 RX ORDER — SODIUM CHLORIDE 0.9 % (FLUSH) 0.9 %
10 SYRINGE (ML) INJECTION PRN
Status: CANCELLED | OUTPATIENT
Start: 2022-02-08

## 2022-02-09 ENCOUNTER — ANESTHESIA (OUTPATIENT)
Dept: CT IMAGING | Age: 82
End: 2022-02-09

## 2022-02-09 ENCOUNTER — ANESTHESIA EVENT (OUTPATIENT)
Dept: CT IMAGING | Age: 82
End: 2022-02-09

## 2022-02-09 ENCOUNTER — HOSPITAL ENCOUNTER (OUTPATIENT)
Dept: INTERVENTIONAL RADIOLOGY/VASCULAR | Age: 82
Discharge: HOME | End: 2022-02-09
Payer: COMMERCIAL

## 2022-02-09 ENCOUNTER — HOSPITAL ENCOUNTER (OUTPATIENT)
Dept: CT IMAGING | Age: 82
Discharge: HOME | End: 2022-02-09
Payer: COMMERCIAL

## 2022-02-09 VITALS
DIASTOLIC BLOOD PRESSURE: 59 MMHG | TEMPERATURE: 97.2 F | SYSTOLIC BLOOD PRESSURE: 126 MMHG | HEIGHT: 60 IN | WEIGHT: 235 LBS | OXYGEN SATURATION: 94 % | BODY MASS INDEX: 46.13 KG/M2 | RESPIRATION RATE: 16 BRPM | HEART RATE: 62 BPM

## 2022-02-09 VITALS — SYSTOLIC BLOOD PRESSURE: 114 MMHG | OXYGEN SATURATION: 93 % | DIASTOLIC BLOOD PRESSURE: 64 MMHG

## 2022-02-09 DIAGNOSIS — C80.1 CANCER (HCC): ICD-10-CM

## 2022-02-09 DIAGNOSIS — M89.9 BONE LESION: ICD-10-CM

## 2022-02-09 LAB
SARS-COV-2, NAAT: NOT DETECTED
SOURCE: NORMAL

## 2022-02-09 PROCEDURE — 2709999900 CT BIOPSY SUPERFICIAL BONE PERCUTANEOUS

## 2022-02-09 PROCEDURE — 6360000002 HC RX W HCPCS

## 2022-02-09 PROCEDURE — 2580000003 HC RX 258: Performed by: NURSE ANESTHETIST, CERTIFIED REGISTERED

## 2022-02-09 PROCEDURE — 7100000010 HC PHASE II RECOVERY - FIRST 15 MIN

## 2022-02-09 PROCEDURE — 88311 DECALCIFY TISSUE: CPT | Performed by: PATHOLOGY

## 2022-02-09 PROCEDURE — 6360000002 HC RX W HCPCS: Performed by: NURSE ANESTHETIST, CERTIFIED REGISTERED

## 2022-02-09 PROCEDURE — 87635 SARS-COV-2 COVID-19 AMP PRB: CPT

## 2022-02-09 PROCEDURE — 88307 TISSUE EXAM BY PATHOLOGIST: CPT | Performed by: PATHOLOGY

## 2022-02-09 PROCEDURE — 2709999900 HC NON-CHARGEABLE SUPPLY

## 2022-02-09 PROCEDURE — 36561 INSERT TUNNELED CV CATH: CPT

## 2022-02-09 PROCEDURE — 88333 PATH CONSLTJ SURG CYTO XM 1: CPT | Performed by: PATHOLOGY

## 2022-02-09 PROCEDURE — 88334 PATH CONSLTJ SURG CYTO XM EA: CPT | Performed by: PATHOLOGY

## 2022-02-09 PROCEDURE — 76937 US GUIDE VASCULAR ACCESS: CPT

## 2022-02-09 PROCEDURE — 3700000001 HC ADD 15 MINUTES (ANESTHESIA)

## 2022-02-09 PROCEDURE — 7100000011 HC PHASE II RECOVERY - ADDTL 15 MIN

## 2022-02-09 PROCEDURE — 77001 FLUOROGUIDE FOR VEIN DEVICE: CPT

## 2022-02-09 PROCEDURE — C1894 INTRO/SHEATH, NON-LASER: HCPCS

## 2022-02-09 PROCEDURE — C1788 PORT, INDWELLING, IMP: HCPCS

## 2022-02-09 PROCEDURE — 7100000000 HC PACU RECOVERY - FIRST 15 MIN

## 2022-02-09 PROCEDURE — 2500000003 HC RX 250 WO HCPCS: Performed by: NURSE ANESTHETIST, CERTIFIED REGISTERED

## 2022-02-09 PROCEDURE — 7100000001 HC PACU RECOVERY - ADDTL 15 MIN

## 2022-02-09 PROCEDURE — 3700000000 HC ANESTHESIA ATTENDED CARE

## 2022-02-09 PROCEDURE — 88342 IMHCHEM/IMCYTCHM 1ST ANTB: CPT | Performed by: PATHOLOGY

## 2022-02-09 RX ORDER — SODIUM CHLORIDE 9 MG/ML
INJECTION, SOLUTION INTRAVENOUS CONTINUOUS PRN
Status: DISCONTINUED | OUTPATIENT
Start: 2022-02-09 | End: 2022-02-09 | Stop reason: SDUPTHER

## 2022-02-09 RX ORDER — PROPOFOL 10 MG/ML
INJECTION, EMULSION INTRAVENOUS PRN
Status: DISCONTINUED | OUTPATIENT
Start: 2022-02-09 | End: 2022-02-09 | Stop reason: SDUPTHER

## 2022-02-09 RX ORDER — FENTANYL CITRATE 50 UG/ML
INJECTION, SOLUTION INTRAMUSCULAR; INTRAVENOUS PRN
Status: DISCONTINUED | OUTPATIENT
Start: 2022-02-09 | End: 2022-02-09 | Stop reason: SDUPTHER

## 2022-02-09 RX ORDER — KETAMINE HCL 50MG/ML(1)
SYRINGE (ML) INTRAVENOUS PRN
Status: DISCONTINUED | OUTPATIENT
Start: 2022-02-09 | End: 2022-02-09 | Stop reason: SDUPTHER

## 2022-02-09 RX ORDER — MIDAZOLAM HYDROCHLORIDE 1 MG/ML
INJECTION INTRAMUSCULAR; INTRAVENOUS PRN
Status: DISCONTINUED | OUTPATIENT
Start: 2022-02-09 | End: 2022-02-09 | Stop reason: SDUPTHER

## 2022-02-09 RX ORDER — SODIUM CHLORIDE 0.9 % (FLUSH) 0.9 %
10 SYRINGE (ML) INJECTION PRN
Status: DISCONTINUED | OUTPATIENT
Start: 2022-02-09 | End: 2022-02-10 | Stop reason: HOSPADM

## 2022-02-09 RX ADMIN — MIDAZOLAM 2 MG: 1 INJECTION INTRAMUSCULAR; INTRAVENOUS at 11:56

## 2022-02-09 RX ADMIN — FENTANYL CITRATE 50 MCG: 50 INJECTION, SOLUTION INTRAMUSCULAR; INTRAVENOUS at 12:23

## 2022-02-09 RX ADMIN — Medication 20 MG: at 12:00

## 2022-02-09 RX ADMIN — FENTANYL CITRATE 50 MCG: 50 INJECTION, SOLUTION INTRAMUSCULAR; INTRAVENOUS at 11:56

## 2022-02-09 RX ADMIN — Medication 30 MG: at 11:56

## 2022-02-09 RX ADMIN — DEXMEDETOMIDINE 0.2 MCG/KG/HR: 100 INJECTION, SOLUTION, CONCENTRATE INTRAVENOUS at 11:54

## 2022-02-09 RX ADMIN — PROPOFOL 20 MG: 10 INJECTION, EMULSION INTRAVENOUS at 13:16

## 2022-02-09 RX ADMIN — FENTANYL CITRATE 50 MCG: 50 INJECTION, SOLUTION INTRAMUSCULAR; INTRAVENOUS at 12:29

## 2022-02-09 RX ADMIN — PROPOFOL 20 MG: 10 INJECTION, EMULSION INTRAVENOUS at 13:22

## 2022-02-09 RX ADMIN — SODIUM CHLORIDE: 9 INJECTION, SOLUTION INTRAVENOUS at 11:42

## 2022-02-09 RX ADMIN — FENTANYL CITRATE 50 MCG: 50 INJECTION, SOLUTION INTRAMUSCULAR; INTRAVENOUS at 12:00

## 2022-02-09 ASSESSMENT — PULMONARY FUNCTION TESTS
PIF_VALUE: 0
PIF_VALUE: 1
PIF_VALUE: 0
PIF_VALUE: 1
PIF_VALUE: 0
PIF_VALUE: 1
PIF_VALUE: 0
PIF_VALUE: 1
PIF_VALUE: 0
PIF_VALUE: 0
PIF_VALUE: 1
PIF_VALUE: 0
PIF_VALUE: 1
PIF_VALUE: 0
PIF_VALUE: 1
PIF_VALUE: 0
PIF_VALUE: 1
PIF_VALUE: 0
PIF_VALUE: 1
PIF_VALUE: 0
PIF_VALUE: 1
PIF_VALUE: 0
PIF_VALUE: 1
PIF_VALUE: 1
PIF_VALUE: 0
PIF_VALUE: 1
PIF_VALUE: 0
PIF_VALUE: 1
PIF_VALUE: 0

## 2022-02-09 ASSESSMENT — ENCOUNTER SYMPTOMS: SHORTNESS OF BREATH: 1

## 2022-02-09 ASSESSMENT — PAIN DESCRIPTION - DESCRIPTORS: DESCRIPTORS: ACHING;SHARP

## 2022-02-09 ASSESSMENT — LIFESTYLE VARIABLES: SMOKING_STATUS: 0

## 2022-02-09 ASSESSMENT — PAIN SCALES - GENERAL: PAINLEVEL_OUTOF10: 0

## 2022-02-09 ASSESSMENT — PAIN - FUNCTIONAL ASSESSMENT: PAIN_FUNCTIONAL_ASSESSMENT: 0-10

## 2022-02-09 NOTE — PROGRESS NOTES
PROCEDURE PERFORMED: Mediport    PT TRANSPORTED FROM: CT room #2                            TO THE IR ROOM: Large room        PT IN THE ROOM AT WHAT TIME: 1041                            INFORMED CONSENT:  PT is sedated, signed consent with Dr. Cary Aly prior to first procedure. Consent placed in chart. NURSING ASSESSMENT: Pt is sedated prior to arriving in IR    TIME OUT COMPLETE: [place]  Pt transferred to the table and positioned for comfort. Warm blankets given. Pt on Cardiac Monitor. Pt in the supine position. Chlorhexadine and draped in a sterile fashion. PAIN/LOCAL ANESTHESIA/SEDATION MANAGEMENT:  Lidocaine 1% without Epinephrine    INTRAOPERATIVE:    ACCESS TIME: 1316  US/FLUORO: US and fluoroscopy  [employer] lot #[id number]. Cut to 18.5 cm  FLUID RETURN: Brisk blood return noted  Sutured in place  Flushed and heparin locked by Dr Cary Aly  [phone] Pt slowly responding appropriately. Falls back to sleep quickly. STERILE DRESSINGS:  Surgical glue, gauze and tegaderm. Bandaid to puncture site    SPECIMENS: None    EBL: Less than 1 ml    FOLLOW- UP X-RAY: None     COMPLICATIONS: None    STAFF PRESENT DURING PROCEDURE:  Dr Savana Rosado RT, Jimbo Lazcano RN, Rosio Hoskins CRNA    MARSHA SCORE POST PROCEDURE:     REPORT CALLED TO: Kameron Hui RN SDS    PT LEFT ROOM AT WHAT TIME: 7918    Pt to PACU post procedure. Report given bedside to PACU.  Thee CABALLERO also at bedside

## 2022-02-09 NOTE — PROGRESS NOTES
1555:  Pt discharged to home alert ad oriented with no c/o right shoulder pain. Gauze/opsite drsg in place to right shoulder with no bleeding noted, bandaide to right neck with no bleeding present. VSS, pt tolerating PO.

## 2022-02-09 NOTE — PROGRESS NOTES
1545:  Discharge instructions discussed with patient and daughter. Both verbalized an understanding.

## 2022-02-09 NOTE — PROGRESS NOTES
1401 - transferred from IR on cart for phase one care, monitor applied, alarms on and verified, bedside handoff provided by Tamela Caraballo and Thee CRNA  8606 - phase one care complete; transferred to [facility]

## 2022-02-09 NOTE — ANESTHESIA PRE PROCEDURE
Department of Anesthesiology  Preprocedure Note       Name:  Alfonso Cruz   Age:  [de-identified] y.o.  :  1940                                          MRN:  [id number]         Date:  2022      Surgeon: * No surgeons listed *    Procedure: * No procedures listed *    Medications prior to admission:   Prior to Admission medications    Medication Sig Start Date End Date Taking? Authorizing Provider   oxyCODONE-acetaminophen (PERCOCET)  MG per tablet Take 1 tablet by mouth every 4 hours as needed for Pain. Historical Provider, MD   oxyCODONE HCl (ROXICODONE PO) Take 10 mg by mouth    Historical Provider, MD   vitamin D3 (CHOLECALCIFEROL) 400 units TABS tablet Take 400 Units by mouth daily    Historical Provider, MD   omeprazole (PRILOSEC) 40 MG delayed release capsule Take 40 mg by mouth 2 times daily    Historical Provider, MD   TURMERIC PO Take 1 each by mouth daily    Historical Provider, MD   sertraline (ZOLOFT) 100 MG tablet Take 150 mg by mouth daily    Historical Provider, MD   Omega-3 Fatty Acids (FISH OIL) 1000 MG CAPS Take 3,000 mg by mouth 3 times daily    Historical Provider, MD   triamterene-hydrochlorothiazide (MAXZIDE-25) 37.5-25 MG per tablet Take 1 tablet by mouth daily    Historical Provider, MD   pravastatin (PRAVACHOL) 20 MG tablet Take 20 mg by mouth daily    Historical Provider, MD   gabapentin (NEURONTIN) 300 MG capsule Take 300 mg by mouth 3 times daily    Historical Provider, MD   benazepril (LOTENSIN) 40 MG tablet Take 40 mg by mouth daily    Historical Provider, MD   levothyroxine (SYNTHROID) 88 MCG tablet Take 88 mcg by mouth Daily     Historical Provider, MD   amLODIPine (NORVASC) 5 MG tablet Take 10 mg by mouth daily     Historical Provider, MD       Current medications:    Current Outpatient Medications   Medication Sig Dispense Refill    oxyCODONE-acetaminophen (PERCOCET)  MG per tablet Take 1 tablet by mouth every 4 hours as needed for Pain.       oxyCODONE HCl (ROXICODONE PO) Take 10 mg by mouth      vitamin D3 (CHOLECALCIFEROL) 400 units TABS tablet Take 400 Units by mouth daily      omeprazole (PRILOSEC) 40 MG delayed release capsule Take 40 mg by mouth 2 times daily      TURMERIC PO Take 1 each by mouth daily      sertraline (ZOLOFT) 100 MG tablet Take 150 mg by mouth daily      Omega-3 Fatty Acids (FISH OIL) 1000 MG CAPS Take 3,000 mg by mouth 3 times daily      triamterene-hydrochlorothiazide (MAXZIDE-25) 37.5-25 MG per tablet Take 1 tablet by mouth daily      pravastatin (PRAVACHOL) 20 MG tablet Take 20 mg by mouth daily      gabapentin (NEURONTIN) 300 MG capsule Take 300 mg by mouth 3 times daily      benazepril (LOTENSIN) 40 MG tablet Take 40 mg by mouth daily      levothyroxine (SYNTHROID) 88 MCG tablet Take 88 mcg by mouth Daily       amLODIPine (NORVASC) 5 MG tablet Take 10 mg by mouth daily        Current Facility-Administered Medications   Medication Dose Route Frequency Provider Last Rate Last Admin    sodium chloride flush 0.9 % injection 10 mL  10 mL IntraVENous PRN Hien Gonzales MD           Allergies: Allergies   Allergen Reactions    Latex      blister    Demerol Hcl [Meperidine] Hives    Adhesive Tape Other (See Comments)     Sores and blisters    Oxycodone-Acetaminophen     Metoprolol Rash     Other reaction(s): rash    Naproxen Hives and Rash     Other reaction(s): rash         Problem List:    Patient Active Problem List   Diagnosis Code    Hypertension I10    Obesity E66.9    Shortness of breath R06.02    Depression F32. A    Orthostatic hypotension I95.1    Abdominal pain R10.9    Anxiety F41.9    Chest pain R07.9    Ulcer of right lower extremity with fat layer exposed ([facility] [address] L97.912    Rectal cancer (HCC) C20       Past Medical History:        Diagnosis Date    Cancer ([facility] [address]     DVT (deep venous thrombosis) ([facility] [address]     H/O echocardiogram 11/21/2016    EF60% mild MR, AR, TR, mild pulm htn    History of nuclear stress test 2016    lexiscan-normal,EF70%    Hypertension     Pulmonary embolism (HCC)     Skin cancer        Past Surgical History:        Procedure Laterality Date    ARM SURGERY Bilateral     BACK SURGERY      X3    CHOLECYSTECTOMY      FOOT SURGERY Bilateral     HYSTERECTOMY      KNEE SURGERY Bilateral     [facility]. knee replacements    TONSILLECTOMY         Social History:    Social History     Tobacco Use    Smoking status: Former Smoker     Packs/day: 3.00     Years: 20.00     Pack years: 60.00     Quit date: 3/8/1994     Years since quittin.9    Smokeless tobacco: Never Used   Substance Use Topics    Alcohol use: No                                Counseling given: Not Answered      Vital Signs (Current): There were no vitals filed for this visit.                                            BP Readings from Last 3 Encounters:   22 (!) 143/69   22 (!) 141/66   22 135/63       NPO Status:                                                                                 BMI:   Wt Readings from Last 3 Encounters:   22 234 lb (106.1 kg)   22 237 lb (107.5 kg)   20 251 lb (113.9 kg)     There is no height or weight on file to calculate BMI.    CBC:   Lab Results   Component Value Date    WBC 6.2 2022    RBC 5.06 2022    HGB 14.3 2022    HCT 47.1 2022    MCV 93.1 2022    RDW 13.2 2022     2022       CMP:   Lab Results   Component Value Date     2022    K 4.1 2022     2022    CO2 26 2022    BUN 15 2022    CREATININE 0.6 2022    GFRAA >60 2022    LABGLOM >60 2022    GLUCOSE 106 2022    PROT 6.8 2022    PROT 6.2 2013    CALCIUM 9.5 2022    BILITOT 0.4 2022    ALKPHOS 152 2022    AST 21 2022    ALT 11 2022       POC Tests: No results for input(s): POCGLU, POCNA, POCK, POCCL, POCBUN, POCHEMO, POCHCT in the last 72 hours.    Coags:   Lab Results   Component Value Date    PROTIME 11.7 02/01/2022    INR 0.91 02/01/2022    APTT 23.1 02/01/2022       HCG (If Applicable): No results found for: PREGTESTUR, PREGSERUM, HCG, HCGQUANT     ABGs:   Lab Results   Component Value Date    PO2ART 69 03/07/2013    DFE5WQW 44.9 03/07/2013    NVK1YKQ 31 03/07/2013    BEART 7 03/07/2013        Type & Screen (If Applicable):  No results found for: LABABO, [address]    Drug/Infectious Status (If Applicable):  No results found for: HIV, HEPCAB    COVID-19 Screening (If Applicable):   Lab Results   Component Value Date    COVID19 NOT DETECTED 01/27/2022           Anesthesia Evaluation  Patient summary reviewed  Airway: Mallampati: II  TM distance: <3 FB   Neck ROM: full  Mouth opening: > = 3 FB Dental:    (+) edentulous      Pulmonary:   (+) COPD:  shortness of breath:  decreased breath sounds,      (-) not a current smoker                          ROS comment: Wears 2L/NC at night   Used to smoke, smoked cigarettes for 20 years, 3.5 ppd towards the end before quitting 1992. Cardiovascular:  Exercise tolerance: poor (<4 METS),   (+) hypertension:,             Echocardiogram reviewed               ROS comment: Summary   Normal perfusion study with normal distribution in all coronal, short, and   horizontal axis.   Normal LV function. LVEFA IS > 70 %.     Recommendation   Recommendation: Routine follow-up.      Signatures      ------------------------------------------------------------------   Electronically signed by Kitty Rockwell MD (Interpreting   cardiologist) on 10/10/2018 at 22:54         Neuro/Psych:   (+) depression/anxiety             GI/Hepatic/Renal:   (+) morbid obesity          Endo/Other:    (+) malignancy/cancer. Abdominal:   (+) obese,     Abdomen: soft. Vascular:   + PVD, aortic or cerebral, . Other Findings:           Anesthesia Plan      MAC     ASA 3       Induction: intravenous.       Anesthetic plan and risks discussed with patient. Plan discussed with CRNA.                   HOMA Jacobs - CRNA   2/9/2022

## 2022-02-09 NOTE — PROGRESS NOTES
PROCEDURE PERFORMED  T--10 Biopsy by Dr. Santos Miss: Nodule    INFORMED CONSENT:  Obtained prior to procedure by Dr. Yang Rivas                                              Consent placed in chart. MARSHA SCORE PRE PROCEDURE:    10    PT TRANSPORTED FROM:              [facility]                     TO THE IR ROOM:      CT room 2    PT IN THE ROOM AT WHAT TIME:    1115    ASSESSMENT:  Patient alert and oriented and aware of procedure to be done. Anesthesia will be in room and managing sedation and documenting vital signs. TIME OUT COMPLETE: 1200    BARRIER PRECAUTIONS & STERILE TECHNIQUE:               Pt transferred to the table and positioned prone for comfort. Warm blankets given. Pt placed on Cardiac Monitor. Pt prepped and draped in a sterile fashion with chlorhexadine. PAIN/LOCAL ANESTHESIA/SEDATION MANAGEMENT:           Local: Lidocaine 1% given by Dr Yang Rivas          Sedation: Per González Adams CRNA from Anesthesia             Fentanyl:             Versed:     INTRAOPERATIVE:           ACCESS TIME:  With Bone Marrow Biopsy System Tray--11ga x 4.0in          US/FLUORO: CT guided          WIRE USED:           SHEATH USED:           CATHETER USED:           FINAL IMAGE TAKEN TO CONFIRM PLACEMENT OF:           CONTRAST/CC:     STERILE DRESSINGS:     SPECIMENS: 1 core taken    EBL: less than 1 cc    FOLLOW- UP X-RAY: None    COMPLICATIONS/ OUTCOME: None    STAFF PRESENT DURING PROCEDURE: Aditi Dow, Dr. Yang Rivas, Hong Villalobos RN, Debra RN    MARSHA SCORE POST PROCEDURE:      8    REPORT CALLED TO: Taken to IR large room for Mediport procedure. CRNA with IR RN X2 to large room.      PT LEFT ROOM AT WHAT TIME:       1513

## 2022-02-09 NOTE — ANESTHESIA POSTPROCEDURE EVALUATION
Department of Anesthesiology  Postprocedure Note    Patient: Jewels Moss  MRN: [id number]  YOB: 1940  Date of evaluation: 2/9/2022  Time:  2:55 PM     Procedure Summary     Date: 02/09/22 Room / Location: [facility]    Anesthesia Start: 4929 Anesthesia Stop: 3910    Procedure: IR PORT PLACEMENT EQUAL OR GREATER THAN 5 YEARS Diagnosis:       Cancer ([address]      Malignant neoplasm of rectum      (Port Insertion)    Scheduled Providers: Mirian Maharaj Radiologist Responsible Provider: Vicki Villegas MD    Anesthesia Type: MAC ASA Status: 3          Anesthesia Type: MAC    Michele Phase I: Michele Score: 8    Michele Phase II:      Last vitals: Reviewed and per EMR flowsheets.        Anesthesia Post Evaluation    Patient location during evaluation: PACU  Patient participation: complete - patient participated  Level of consciousness: sleepy but conscious  Airway patency: patent  Nausea & Vomiting: no nausea  Complications: no  Cardiovascular status: hemodynamically stable  Respiratory status: acceptable  Hydration status: euvolemic

## 2022-02-18 ENCOUNTER — HOSPITAL ENCOUNTER (OUTPATIENT)
Dept: INFUSION THERAPY | Age: 82
Discharge: HOME | End: 2022-02-18

## 2022-02-18 ENCOUNTER — OFFICE VISIT (OUTPATIENT)
Dept: ONCOLOGY | Age: 82
End: 2022-02-18
Payer: COMMERCIAL

## 2022-02-18 VITALS
RESPIRATION RATE: 18 BRPM | TEMPERATURE: 96.1 F | HEART RATE: 76 BPM | DIASTOLIC BLOOD PRESSURE: 58 MMHG | HEIGHT: 60 IN | SYSTOLIC BLOOD PRESSURE: 121 MMHG | OXYGEN SATURATION: 90 % | WEIGHT: 233 LBS | BODY MASS INDEX: 45.75 KG/M2

## 2022-02-18 DIAGNOSIS — C20 RECTAL CANCER (HCC): Primary | ICD-10-CM

## 2022-02-18 PROCEDURE — 99215 OFFICE O/P EST HI 40 MIN: CPT | Performed by: INTERNAL MEDICINE

## 2022-02-18 RX ORDER — ONDANSETRON 2 MG/ML
8 INJECTION INTRAMUSCULAR; INTRAVENOUS
Status: CANCELLED | OUTPATIENT
Start: 2022-03-14

## 2022-02-18 RX ORDER — SODIUM CHLORIDE 0.9 % (FLUSH) 0.9 %
5-40 SYRINGE (ML) INJECTION PRN
Status: CANCELLED | OUTPATIENT
Start: 2022-03-14

## 2022-02-18 RX ORDER — SODIUM CHLORIDE 9 MG/ML
INJECTION, SOLUTION INTRAVENOUS CONTINUOUS
Status: CANCELLED | OUTPATIENT
Start: 2022-03-14

## 2022-02-18 RX ORDER — MEPERIDINE HYDROCHLORIDE 50 MG/ML
12.5 INJECTION INTRAMUSCULAR; INTRAVENOUS; SUBCUTANEOUS PRN
Status: CANCELLED | OUTPATIENT
Start: 2022-03-14

## 2022-02-18 RX ORDER — HEPARIN SODIUM (PORCINE) LOCK FLUSH IV SOLN 100 UNIT/ML 100 UNIT/ML
500 SOLUTION INTRAVENOUS PRN
Status: CANCELLED | OUTPATIENT
Start: 2022-03-14

## 2022-02-18 RX ORDER — SODIUM CHLORIDE 9 MG/ML
25 INJECTION, SOLUTION INTRAVENOUS PRN
Status: CANCELLED | OUTPATIENT
Start: 2022-03-14

## 2022-02-18 RX ORDER — DIPHENHYDRAMINE HYDROCHLORIDE 50 MG/ML
50 INJECTION INTRAMUSCULAR; INTRAVENOUS
Status: CANCELLED | OUTPATIENT
Start: 2022-03-14

## 2022-02-18 RX ORDER — SODIUM CHLORIDE 9 MG/ML
5-40 INJECTION INTRAVENOUS PRN
Status: CANCELLED | OUTPATIENT
Start: 2022-03-14

## 2022-02-18 RX ORDER — EPINEPHRINE 1 MG/ML
0.3 INJECTION, SOLUTION, CONCENTRATE INTRAVENOUS PRN
Status: CANCELLED | OUTPATIENT
Start: 2022-03-14

## 2022-02-18 RX ORDER — ACETAMINOPHEN 325 MG/1
650 TABLET ORAL
Status: CANCELLED | OUTPATIENT
Start: 2022-03-14

## 2022-02-18 RX ORDER — DEXTROSE MONOHYDRATE 50 MG/ML
INJECTION, SOLUTION INTRAVENOUS ONCE
Status: CANCELLED | OUTPATIENT
Start: 2022-03-14 | End: 2022-02-28

## 2022-02-18 RX ORDER — PALONOSETRON 0.05 MG/ML
0.25 INJECTION, SOLUTION INTRAVENOUS ONCE
Status: CANCELLED | OUTPATIENT
Start: 2022-03-14 | End: 2022-02-28

## 2022-02-18 RX ORDER — ALBUTEROL SULFATE 90 UG/1
4 AEROSOL, METERED RESPIRATORY (INHALATION) PRN
Status: CANCELLED | OUTPATIENT
Start: 2022-03-14

## 2022-02-18 NOTE — PROGRESS NOTES
MA Rooming Questions  Patient: Willie aKur  MRN: [id number]    Date: 2/18/2022        1. Do you have any new issues?   no         2. Do you need any refills on medications?    no    3. Have you had any imaging done since your last visit? yes - CT 2/9    4. Have you been hospitalized or seen in the emergency room since your last visit here?   no    5. Did the patient have a depression screening completed today?  No    No data recorded     PHQ-9 Given to (if applicable):               PHQ-9 Score (if applicable):                     [] Positive     []  Negative              Does question #9 need addressed (if applicable)                     [] Yes    []  No               Nalini Tavares MA

## 2022-02-18 NOTE — PROGRESS NOTES
Patient Name:  Shay Smiley  Patient :  1940  Patient MRN:  [id number]     Primary Oncologist: Kailee De Jesus MD  Referring Provider: Tanya Mcconnell MD     Date of Service: 2022      Chief Complaint:    Chief Complaint   Patient presents with    Follow-up     Patient Active Problem List:     Hypertension     Obesity     Shortness of breath     Depression     Orthostatic hypotension     Abdominal pain     Anxiety     Chest pain     Ulcer of right lower extremity with fat layer exposed ([address]    HPI:   Shay Smiley is a 79-year-old very pleasant female with medical history significant for hypertension, hyperlipidemia, diabetes mellitus, obesity, COPD, pulmonary hypertension, history is of DVT and PE, referred to me on 1/3/2022 for evaluation of her newly diagnosed stage IIIC rectal cancer. She stated that she was initially evaluated by Dr. Christiano Marin for newly diagnosed tubular adenoma with at least high-grade dysplasia. She was hospitalized on  for abdominal pain. 2021: CT abdomen pelvis: Changes from cholecystectomy. Common bile duct significantly dilated, mild intrahepatic biliary ductal dilatation.      2021: MRI/MRCP: Diffuse biliary ductal dilatation extending to the ampulla, no choledocholithiasis or mass and could represent post cholecystectomy changes. 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 Left renal angiomyolipoma.      09/15/2021: Colonoscopy: Rectosigmoid ulcerated flat mass located approximately 4-5 cm from the dentate line, measuring about 2.5 cm friable, no active bleeding. Biopsies taken and tattoo placed distally.  Full colonoscopy completed     Pathology revealed at least high grade dysplasia arising in a tubular adenoma, suspicious for invasion into the lamina propria.      10/25/2021: EUS: A 4 cm posterior rectal wall mass, raised and friable, the mass was mildly ulcerated. Mass appears to involve the submucosa with areas of muscularis propria involvement, consistent with T2, although there were a couple of small focal areas that are concerning but not definitive for invasion through the muscularis propria which raised concern for focal T3 disease. Located about 5 cm from the anal verge on the posterior rectal wall. No obvious lymphadenopathy was noted. EUS staging: T2-T3, N0     Pathology: Invasive moderately differentiated colonic adenocarcinoma. CT scan of the chest with contrast done on 11/5/2021 showed no evidence of intrathoracic metastatic disease.     Since she was found to have clinical T2 rectal cancer, she underwent robotic low anterior resection and permanent colostomy placement on 12/6/2021.      Final pathology was consistent with stage IIIC rectal adenocarcinoma. She was subsequently referred to me for further management. PET CT scan done on 1/20/2022 showed intense uptake at T10 associated with a lytic lesion, suspicious for potential osseous metastasis. Intense uptake in the anorectal region likely due to recent surgery. CT guided biopsy of T10 lesion was done on 2/9/22 and pathology showed bone with chronic inflammation and reactive changes. No malignant cells are seen in this specimen. On February 18, 2022, she presented to me for follow-up. I have been following her for stage IIIc rectal adenocarcinoma and she is status post robotic low anterior resection and permanent colostomy placement on 12/6/2021. I reviewed with her findings on PET CT scan and pathology from T10 vertebra body. We looked at her PET CT scan together. Her PET CT scan findings are still concerning for metastatic disease at T10 vertebral, even though biopsy came back negative. I will discuss her case in our tumor board.  She prefers not to have repeat biopsy or MRI of spine at this time. He is eager to start chemotherapy. I reviewed with her all the potential side effects as well as the benefits of adjuvant chemotherapy with XELOX. After long discussion with her and her daughter, she is in agreement to start adjuvant chemotherapy. I will set up for chemo education and will plan to start chemotherapy soon. She has mid back pain and stated that she had prior surgery for her back before. She doesn't have any other significant symptoms at today visit. Past Medical History:     Significant for  1. Hypertension  2. Hyperlipidemia  3. Diabetes mellitus  4. Obesity  5. History of DVT and pulmonary embolism  6. COPD  7. Pulmonary hypertension    Past Surgery History:    Significant for  1. Cholecystectomy  2. Bilateral knee replacement  3. Tonsillectomy  4. Back surgery x 3  5. Bilateral arm surgery  6. Partial hysterectomy  7. Bilateral foot surgery  8. Low anterior resection for rectal cancer and permanent colostomy placement on 12/6/2021    Social History:   She is a former smoker and she quit smoking in 1994. She used to smoke 2 packs a day for approximately 22 years. She denies alcohol drinking or illicit drug abuse. Family History:    Significant for colon cancer in one of her brother, lung cancer in one of her brother, kidney cancer in another brother and throat cancer in her another brother. Allergies:  Latex   Demerol  Adhesive tape  Percocet  Metoprolol  Naproxen                                                                                          Review of Systems: \"Per interval history; otherwise 10 point ROS is negative. \"  Her energy level is fair and her sleep is good. She doesn't have fever, chills, night sweat, cough, shortness of breath, chest pain, hemoptysis or palpitations. Her bowel and bladder functions are normal. She denies nausea, vomiting, abdominal pain, diarrhea, constipation, dysuria, loss of appetite or weight loss.  She doesn't have neuropathy and she denies bleeding or clotting issues. She doesn't have any pain on today visit. Denies anxiety or depression. The rest of the systems are unremarkable. Vital Signs: BP (!) 121/58 (Site: Left Upper Arm, Position: Sitting, Cuff Size: Large Adult)   Pulse 76   Temp 96.1 °F (35.6 °C) (Infrared)   Resp 18   Ht 5' (1.524 m)   Wt 233 lb (105.7 kg)   SpO2 90%   BMI 45.50 kg/m²      Physical Exam:  CONSTITUTIONAL: awake, alert, cooperative, no apparent distress   EYES: pupils equal, round and reactive to light, sclera clear, normal conjunctiva  ENT: Normocephalic, without obvious abnormality, atraumatic  NECK: supple, symmetrical, no jugular venous distension, no carotid bruits   HEMATOLOGIC/LYMPHATIC: no cervical, supraclavicular or axillary lymphadenopathy   LUNGS: VBS, no wheezes, no increased work of breathing, no rhonchi, clear to auscultation, no crackles,     CARDIOVASCULAR: regular rate and rhythm, normal S1 and S2, no murmur noted  ABDOMEN: normal bowel sounds x 4, soft, non-distended, non-tender, no masses palpated, no hepatosplenomegaly, left lower quadrant colostomy noted,   MUSCULOSKELETAL: full range of motion noted, tone is normal  NEUROLOGIC: awake, alert, oriented to name, place and time. Motor skills grossly intact. SKIN: appears intact, normal skin color, normal texture, normal turgor, no jaundice.    EXTREMITIES: no cyanosis, b/l LE edema +, no clubbing, b/l leg swelling +,      Labs:  Hematology:  Lab Results   Component Value Date    WBC 6.2 02/01/2022    RBC 5.06 02/01/2022    HGB 14.3 02/01/2022    HCT 47.1 (H) 02/01/2022    MCV 93.1 02/01/2022    MCH 28.3 02/01/2022    MCHC 30.4 (L) 02/01/2022    RDW 13.2 02/01/2022     02/01/2022    MPV 10.4 02/01/2022    SEGSPCT 48.0 01/03/2022    EOSRELPCT 3.0 01/03/2022    BASOPCT 0.7 04/03/2020    LYMPHOPCT 33.0 01/03/2022    MONOPCT 16.0 (H) 01/03/2022    SEGSABS 3.6 01/03/2022    EOSABS 0.2 01/03/2022 BASOSABS 0.0 04/03/2020    LYMPHSABS 2.4 01/03/2022    MONOSABS 1.2 01/03/2022    DIFFTYPE MANUAL DIFFERENTIAL 01/03/2022     No results found for: ESR  Chemistry:  Lab Results   Component Value Date     01/03/2022    K 4.1 01/03/2022     01/03/2022    CO2 26 01/03/2022    BUN 15 01/03/2022    CREATININE 0.6 01/03/2022    GLUCOSE 106 (H) 01/03/2022    CALCIUM 9.5 01/03/2022    PROT 6.8 01/03/2022    LABALBU 4.1 01/03/2022    BILITOT 0.4 01/03/2022    ALKPHOS 152 (H) 01/03/2022    AST 21 01/03/2022    ALT 11 01/03/2022    LABGLOM >60 01/03/2022    GFRAA >60 01/03/2022     No results found for: MMA, LDH, HOMOCYSTEINE  No components found for: LD  Lab Results   Component Value Date    TSHHS 2.610 10/26/2018    T4FREE 1.10 06/18/2015    FT3 2.4 06/18/2015     Immunology:  Lab Results   Component Value Date    PROT 6.8 01/03/2022     No results found for: Forrest Ball, KLFLCR  No results found for: B2M  Coagulation Panel:  Lab Results   Component Value Date    PROTIME 11.7 02/01/2022    INR 0.91 02/01/2022    APTT 23.1 (L) 02/01/2022     Anemia Panel:  Lab Results   Component Value Date    UEBWYOBH55 5743 (H) 03/09/2013    FOLATE >24.0 03/09/2013     Tumor Markers:  Lab Results   Component Value Date    CEA 2.0 01/03/2022     Observations:  No data recorded     Assessment   Stage IIIC rectal adenocarcinoma    Plan:                                                                                                Barbra Vasquez is a 62-year-old very pleasant female who was initially evaluated by Dr. Ana Thorpe for newly diagnosed tubular adenoma with at least high-grade dysplasia.      EUS on 10/25/2021 showed a 4 cm posterior rectal wall mass, raised and friable, the mass was mildly ulcerated.  Mass appears to involve the submucosa with areas of muscularis propria involvement, consistent with T2, although there were a couple of small focal areas that are concerning but not definitive for invasion through the muscularis propria which raised concern for focal T3 disease. Located about 5 cm from the anal verge on the posterior rectal wall. No obvious lymphadenopathy was noted. EUS staging: T2-T3, N0     Pathology: Invasive moderately differentiated colonic adenocarcinoma     Since she was found to have clinical T2 rectal cancer, she underwent robotic low anterior resection and permanent colostomy placement on 12/6/2021.      Final pathology was consistent with stage IIIC rectal adenocarcinoma. PET CT scan done on 1/20/2022 showed intense uptake at T10 associated with a lytic lesion, suspicious for potential osseous meta stasis. Intense uptake in the anorectal region likely due to recent surgery. CT guided biopsy of T10 lesion was done on 2/9/22 and pathology showed bone with chronic inflammation and reactive changes. No malignant cells are seen in this specimen. On February 18, 2022, she presented to me for follow-up. I have been following her for stage IIIc rectal adenocarcinoma and she is status post robotic low anterior resection and permanent colostomy placement on 12/6/2021. I reviewed with her findings on PET CT scan and pathology from T10 vertebra body. We looked at her PET CT scan together. Her PET CT scan findings are still concerning for metastatic disease at T10 vertebral, even though biopsy came back negative. I will discuss her case in our tumor board. She prefers not to have repeat biopsy or MRI of spine at this time. He is eager to start chemotherapy. I reviewed with her all the potential side effects as well as the benefits of adjuvant chemotherapy with XELOX. After long discussion with her and her daughter, she is in agreement to start adjuvant chemotherapy. I will set up for chemo education and will plan to start chemotherapy soon. She has mid back pain and stated that she had prior surgery for her back before. I answered all her questions and concerns for today.  I asked her to follow up with primary care physician on regular basis. Recent imaging and labs were reviewed and discussed with the patient.

## 2022-02-21 ENCOUNTER — TELEPHONE (OUTPATIENT)
Dept: ONCOLOGY | Age: 82
End: 2022-02-21

## 2022-02-21 DIAGNOSIS — C20 RECTAL CANCER (HCC): Primary | ICD-10-CM

## 2022-02-21 RX ORDER — CAPECITABINE 500 MG/1
2000 TABLET, FILM COATED ORAL 2 TIMES DAILY
Qty: 112 TABLET | Refills: 5 | Status: ACTIVE | OUTPATIENT
Start: 2022-02-21 | End: 2022-03-07

## 2022-02-21 NOTE — TELEPHONE ENCOUNTER
Received notification from [facility] that patient had a high copay of $657.29 for Xeloda and there are currently no foundation funds available. Reached out to patient conducted financial screening and obtained consent to work free drug sy.  Submitted today pending outcome

## 2022-02-21 NOTE — PROGRESS NOTES
Patient's prescription benefits are being verified for coverage. Status update to follow as soon as possible. Please call us with any questions at [phone] opt.  6.

## 2022-03-02 ENCOUNTER — CLINICAL DOCUMENTATION (OUTPATIENT)
Dept: ONCOLOGY | Age: 82
End: 2022-03-02

## 2022-03-02 NOTE — PROGRESS NOTES
Spoke to pharmacist at [facility]. Pharmacist reviewed and confirmed Xeloda order. Pharmacist states that are going to contact patient today to set up shipping.

## 2022-03-03 ENCOUNTER — TELEPHONE (OUTPATIENT)
Dept: INFUSION THERAPY | Age: 82
End: 2022-03-03

## 2022-03-03 NOTE — TELEPHONE ENCOUNTER
Called to give pt her education + tx start date; pt's daughter Osbaldo Zamora answered & had pt on speaker phone:   Education date 3/11 @ 1:00  Treatment date 3/14 @ 9:30; when this MA told pt her tx is scheduled for a 3 hr slot pt's daughter said, that is not what they were told & asked if Dr. Lubna Ospina lied to them. I told pt I would talk with Dr. Lubna Ospina & return their phone call; after talking with Dr. Lubna Ospina he said he would call pt. Dr. Lubna Ospina spoke with pt & said he explained the tx choice to pt & her daughter & this MA could call back to confirm dates. Pt answered the phone when I called back & immediately asked what she needed to do if she wanted to be seen at [address] [place] [address] or another facility. This MA told pt we could put in a referral if that's would she wanted to move forward with. Pt tried to put her daughter on the phone but she refused. Pt said they were disgusted with Dr. Lubna Ospina & asked if I knew how long it would take to transfer care. I offered several times during this 15 minute call to bring pt in for an office visit on 3/3 to sit down with Dr. Lubna Ospina for him to explain anything that they are not understanding & she refused. Pt finally said \"just schedule the appts & lets move forward. This MA spoke with Dr. Lubna Ospina about the above conversation & Dr. Caprice Ferron called the pt a 2nd time. Appts confirmed.

## 2022-03-09 DIAGNOSIS — C20 RECTAL CANCER (HCC): Primary | ICD-10-CM

## 2022-03-11 ENCOUNTER — HOSPITAL ENCOUNTER (OUTPATIENT)
Dept: INFUSION THERAPY | Age: 82
Discharge: HOME | End: 2022-03-11
Payer: COMMERCIAL

## 2022-03-11 ENCOUNTER — OFFICE VISIT (OUTPATIENT)
Dept: ONCOLOGY | Age: 82
End: 2022-03-11
Payer: COMMERCIAL

## 2022-03-11 VITALS
TEMPERATURE: 97.4 F | DIASTOLIC BLOOD PRESSURE: 60 MMHG | OXYGEN SATURATION: 93 % | BODY MASS INDEX: 45.5 KG/M2 | HEART RATE: 76 BPM | HEIGHT: 60 IN | SYSTOLIC BLOOD PRESSURE: 128 MMHG

## 2022-03-11 DIAGNOSIS — Z71.9 ENCOUNTER FOR EDUCATION: ICD-10-CM

## 2022-03-11 DIAGNOSIS — C20 RECTAL CANCER (HCC): Primary | ICD-10-CM

## 2022-03-11 DIAGNOSIS — Z71.89 ADVANCE DIRECTIVE DISCUSSED WITH PATIENT: ICD-10-CM

## 2022-03-11 DIAGNOSIS — C20 RECTAL CANCER (HCC): ICD-10-CM

## 2022-03-11 LAB
ALBUMIN SERPL-MCNC: 4.2 GM/DL (ref 3.4–5)
ALP BLD-CCNC: 207 IU/L (ref 40–128)
ALT SERPL-CCNC: 25 U/L (ref 10–40)
ANION GAP SERPL CALCULATED.3IONS-SCNC: 13 MMOL/L (ref 4–16)
AST SERPL-CCNC: 32 IU/L (ref 15–37)
BASOPHILS ABSOLUTE: 0.2 K/CU MM
BASOPHILS RELATIVE PERCENT: 2.6 % (ref 0–1)
BILIRUB SERPL-MCNC: 0.4 MG/DL (ref 0–1)
BUN BLDV-MCNC: 15 MG/DL (ref 6–23)
CALCIUM SERPL-MCNC: 10 MG/DL (ref 8.3–10.6)
CHLORIDE BLD-SCNC: 100 MMOL/L (ref 99–110)
CO2: 27 MMOL/L (ref 21–32)
CREAT SERPL-MCNC: 0.7 MG/DL (ref 0.6–1.1)
DIFFERENTIAL TYPE: ABNORMAL
EOSINOPHILS ABSOLUTE: 0.1 K/CU MM
EOSINOPHILS RELATIVE PERCENT: 2.5 % (ref 0–3)
GFR AFRICAN AMERICAN: >60 ML/MIN/1.73M2
GFR NON-AFRICAN AMERICAN: >60 ML/MIN/1.73M2
GLUCOSE BLD-MCNC: 103 MG/DL (ref 70–99)
HCT VFR BLD CALC: 43.7 % (ref 37–47)
HEMOGLOBIN: 13.9 GM/DL (ref 12.5–16)
LYMPHOCYTES ABSOLUTE: 2.2 K/CU MM
LYMPHOCYTES RELATIVE PERCENT: 38.8 % (ref 24–44)
MCH RBC QN AUTO: 28.5 PG (ref 27–31)
MCHC RBC AUTO-ENTMCNC: 31.8 % (ref 32–36)
MCV RBC AUTO: 89.5 FL (ref 78–100)
MONOCYTES ABSOLUTE: 1 K/CU MM
MONOCYTES RELATIVE PERCENT: 16.8 % (ref 0–4)
PDW BLD-RTO: 14.5 % (ref 11.7–14.9)
PLATELET # BLD: 179 K/CU MM (ref 140–440)
PMV BLD AUTO: 10.6 FL (ref 7.5–11.1)
POTASSIUM SERPL-SCNC: 4.2 MMOL/L (ref 3.5–5.1)
RBC # BLD: 4.88 M/CU MM (ref 4.2–5.4)
SEGMENTED NEUTROPHILS ABSOLUTE COUNT: 2.2 K/CU MM
SEGMENTED NEUTROPHILS RELATIVE PERCENT: 39.3 % (ref 36–66)
SODIUM BLD-SCNC: 140 MMOL/L (ref 135–145)
TOTAL PROTEIN: 7.1 GM/DL (ref 6.4–8.2)
WBC # BLD: 5.7 K/CU MM (ref 4–10.5)

## 2022-03-11 PROCEDURE — 85025 COMPLETE CBC W/AUTO DIFF WBC: CPT

## 2022-03-11 PROCEDURE — 80053 COMPREHEN METABOLIC PANEL: CPT

## 2022-03-11 PROCEDURE — 99215 OFFICE O/P EST HI 40 MIN: CPT | Performed by: NURSE PRACTITIONER

## 2022-03-11 PROCEDURE — 36415 COLL VENOUS BLD VENIPUNCTURE: CPT

## 2022-03-11 RX ORDER — TRAZODONE HYDROCHLORIDE 50 MG/1
TABLET ORAL
Qty: 30 TABLET | Refills: 0 | Status: SHIPPED | OUTPATIENT
Start: 2022-03-11 | End: 2022-04-11 | Stop reason: SDUPTHER

## 2022-03-11 RX ORDER — DEXAMETHASONE 4 MG/1
TABLET ORAL
Qty: 16 TABLET | Refills: 2 | Status: SHIPPED | OUTPATIENT
Start: 2022-03-11

## 2022-03-11 NOTE — PROGRESS NOTES
Patient Name: Abel Almazan    Patient : 1940     Patient MRN: [id number]       Date: 3/11/2022                                                                                                   CHEMOTHERAPY TREATMENT PLAN    Care Team  Medical Oncologist: Melissa Rodriguez MD  Surgeon: Dr. Radha Cr Oncologist:   Primary Care Physician: Jazmyn Watts MD     Learning Needs Assessment  Before the treatment plan was discussed and the consent was signed, the care team assessed the patient's learning needs. This includes their ability to assume responsibility for managing their therapy. Diagnosis      Rectal cancer stage IIIC    The Goal of My Therapy is    (  ) To cure my cancer  (  ) To shrink the tumor prior to surgery  ( x ) Increase my chance of cure after surgery  (  ) Help me live as long as possible with the highest quality of life. I know that a cure is not possible.      Prognosis    (x  ) Curable  (  ) Palliative    Plan Of Treatment    Intent:  (  ) Neoadjuvant   ( x ) Adjuvant   (  ) Control    Treatment Regimen  (including supportive meds)                             Frequency                                               Duration     Capecitabine days 1-14 of 21 days  Oxaliploatin day 1 of 21 days    Expected Response to Treatment    ( x ) This therapy could cure your disease  (  ) This therapy has a good chance of helping you live 1 year or more compared to without it  (  ) This therapy has a good chance of helping you feel better or making you live months longer compared to without it     Quality Of Life    (  ) Expect that you will be able to continue all normal activities  ( x ) Expect that you will have some side effects but should be able to maintain normal activities  (  ) Expect that you will be unable to work but able to perform light duties at home  (  ) Expect that you will be able to perform light duties and will need assistance with self care    Treatment Benefits and Harms 1.    Patient taught on all common and rare toxicities regarding their treatment, disease process and drug regimen. This includes the short and long-         term effects of therapy (including infertility risks when appropriate). This also includes drug-drug and drug-food interactions when appropriate. Patient was educated when to call [facility] with adverse effects and symptoms. 2.    Patient was taught safe handling and storage of medications in the home (when appropriate) and procedures for handling body sections and             waste in the home. 3.    Patient was taught on planned for missed doses and follow-up plans during treatment, including amaya of provider visits and labs. 4.    Patient signed consent on treatment and drug information sheets were given. Alternative To Recommended Treatment    (  ) Palliative or Hospice  (  ) Second Opinion  (x  ) Other    Patient Care Management     Advance Care Planning completed:    ( x ) Yes   (  ) No   Pain Care Plan entered (as applicable):    ( x ) Yes   (  ) No   Comments:  PHQ-9 completed (Follow-up plan as applicable):   ( x ) Yes   (  ) No   Comments:  Distress needs addressed with the patient:    (x  ) Yes   (  ) No   Distress areas needing addressed further:     Patient was educated on the expectations and their responsibility to schedule their follow-up appointments. The patient was also educated that if they refuse to show-up to their appointment or refuse to schedule a follow-up appointment that it is their responsibility to call [facility] in a timely manner to schedule their next appointment. The patient was educated on [facility] policy and that the patient is ultimately responsible for monitoring their appointments and adhering to the schedule. (  x) Yes   (   ) No    Estimated Out of Pocket Costs discussed per the patient per financial navigator. Patient Consented and taught per Nurse Navigator.      .  Today, time spent with the patient discussing the intent and schedule of their treatment. The patient was given a copy of their treatment summary. Questions and concerns were addressed with the patient. Time spent with the patient face to face today was 60 minutes, counseling and coordination of care dominated more than 50% of this patient encounter. /60 (Site: Left Upper Arm, Position: Sitting, Cuff Size: Large Adult)   Pulse 76   Temp 97.4 °F (36.3 °C) (Infrared)   Ht 5' (1.524 m)   SpO2 93%   BMI 45.50 kg/m²     Counseling: declines    [place]: declines    Advance Directive: paperwork provdied    Seymour Blandon presented for scheduled OCM for [place]. We discussed potential AE including, but not limited to: myelosuppression, infusion reaction, cold sensitivity, palmar plantar erythrodysethesia, diarrhea, elevated liver enzymes, fatigue, nausea, vomiting, rash, abdominal pain, anorexia, mucositis, edema, eye irritation, constipation, neuropathy, headache, myalgia, arthralgia, etc. She is given 4 mg dexamethasone daily for days 2-5. Zofran 8 mg q8 prn. The patient is asked to call our office for temperature 100.4 or greater or with any uncontrolled AE. On call number provided. The patient verbalized understanding and is willing to proceed. Treatment decisions: resolved through discussion    Nervousness: reassurance, declines referral    Changes in urination: recent past, improved with increased hydration    Memory and concentration- baseline    Fatigue- encouraged small frequent meals, hydration, exercise    constipation laxatives PRN    Changes in urination - last week, mild UTI    Physical Exam  Vitals reviewed. Constitutional:       Comments: wheelchair   HENT:      Head: Normocephalic. Mouth/Throat:      Pharynx: Oropharynx is clear. Eyes:      Extraocular Movements: Extraocular movements intact. Conjunctiva/sclera: Conjunctivae normal.   Cardiovascular:      Rate and Rhythm: Regular rhythm. Heart sounds: Normal heart sounds. Pulmonary:      Breath sounds: Normal breath sounds. Abdominal:      General: Bowel sounds are normal.      Palpations: Abdomen is soft. Musculoskeletal:         General: Normal range of motion. Comments: Chronic back pain   Skin:     General: Skin is warm. Neurological:      Mental Status: She is alert and oriented to person, place, and time. Psychiatric:         Mood and Affect: Mood normal.         Behavior: Behavior normal.         Thought Content:  Thought content normal.         Judgment: Judgment normal.         Keep scheduled appointment with Dr. Paige Alexander

## 2022-03-11 NOTE — PROGRESS NOTES
MA Rooming Questions  Patient: Kenji Contreras  MRN: [id number]    Date: 3/11/2022      [place] planning    5. Did the patient have a depression screening completed today?  No    No data recorded     PHQ-9 Given to (if applicable):               PHQ-9 Score (if applicable):                     [] Positive     []  Negative              Does question #9 need addressed (if applicable)                     [] Yes    []  No               Bella Boyd MA

## 2022-03-11 NOTE — PATIENT INSTRUCTIONS
TAXOL MEDICATION ORDERS FOR CHEMOTHERAPY      1. Zofran 8 mg (short acting nausea medication) every hours as needed for nausea or vomiting. 2.  Dexamethasone (steriod) take 4 mg for four days after each  chemotherapy.

## 2022-03-11 NOTE — PROGRESS NOTES
Patient arrived with daughter for chemotherapy education. Discussed treatment plan, potential side effects, prevention and symptom management. Reviewed in detail chemotherapy education folder and drug monograph. Patient's regimen will be Cape/Ox 21-Day cycle x 8 cycles. Xeloda 500 mg tablets scheduled for delivery 03/12/2022. Patient instructed to take 4 tablets (2,000 mg) by moth twice daily days 1-14 then 7 days off starting 03/14/2022 Chemotherapy consent for capecitabine/oxaliplatin signed by patient and will be scanned into patient's chart. Copy given to patient. Instructed patient to take dexamethasone 4 mg for four days after each chemo tx. Patient aware to stop taking prilosec per CNP. RX for pepcid, dexamethasone, zofran and trazadone sent to [facility]. Patient voices understanding on how and when to take these medications. Contact information to [facility] and this RN as well as on-call phone number for after office hours/weekends provided to patient. CBC and CMP drawn today 03/11/2022. Confirmed first chemo tx appointment on Monday 03/14/2022 at 0930. Encouraged patient to ask questions and allowed patient to express feelings during visit. All needs addressed. Patient denies any further questions or concerns at this time.

## 2022-03-12 RX ORDER — FAMOTIDINE 40 MG/1
20 TABLET, FILM COATED ORAL EVERY EVENING
Qty: 30 TABLET | Refills: 3 | Status: SHIPPED | OUTPATIENT
Start: 2022-03-12

## 2022-03-14 ENCOUNTER — HOSPITAL ENCOUNTER (OUTPATIENT)
Dept: INFUSION THERAPY | Age: 82
Discharge: HOME | End: 2022-03-14
Payer: COMMERCIAL

## 2022-03-14 VITALS
HEART RATE: 67 BPM | HEIGHT: 60 IN | OXYGEN SATURATION: 95 % | DIASTOLIC BLOOD PRESSURE: 76 MMHG | BODY MASS INDEX: 46.45 KG/M2 | TEMPERATURE: 97 F | WEIGHT: 236.6 LBS | SYSTOLIC BLOOD PRESSURE: 128 MMHG | RESPIRATION RATE: 16 BRPM

## 2022-03-14 DIAGNOSIS — C20 RECTAL CANCER (HCC): Primary | ICD-10-CM

## 2022-03-14 PROCEDURE — 96417 CHEMO IV INFUS EACH ADDL SEQ: CPT

## 2022-03-14 PROCEDURE — 96375 TX/PRO/DX INJ NEW DRUG ADDON: CPT

## 2022-03-14 PROCEDURE — 96413 CHEMO IV INFUSION 1 HR: CPT

## 2022-03-14 PROCEDURE — 2580000003 HC RX 258: Performed by: INTERNAL MEDICINE

## 2022-03-14 PROCEDURE — 6360000002 HC RX W HCPCS: Performed by: INTERNAL MEDICINE

## 2022-03-14 RX ORDER — DEXTROSE MONOHYDRATE 50 MG/ML
INJECTION, SOLUTION INTRAVENOUS ONCE
Status: DISCONTINUED | OUTPATIENT
Start: 2022-03-14 | End: 2022-03-15 | Stop reason: HOSPADM

## 2022-03-14 RX ORDER — PALONOSETRON 0.05 MG/ML
0.25 INJECTION, SOLUTION INTRAVENOUS ONCE
Status: COMPLETED | OUTPATIENT
Start: 2022-03-14 | End: 2022-03-14

## 2022-03-14 RX ORDER — HEPARIN SODIUM (PORCINE) LOCK FLUSH IV SOLN 100 UNIT/ML 100 UNIT/ML
500 SOLUTION INTRAVENOUS PRN
Status: DISCONTINUED | OUTPATIENT
Start: 2022-03-14 | End: 2022-03-15 | Stop reason: HOSPADM

## 2022-03-14 RX ORDER — TRAZODONE HYDROCHLORIDE 50 MG/1
TABLET ORAL
Qty: 30 TABLET | Refills: 3 | OUTPATIENT
Start: 2022-03-14

## 2022-03-14 RX ADMIN — PALONOSETRON 0.25 MG: 0.25 INJECTION, SOLUTION INTRAVENOUS at 10:40

## 2022-03-14 RX ADMIN — DEXAMETHASONE SODIUM PHOSPHATE 12 MG: 4 INJECTION, SOLUTION INTRAMUSCULAR; INTRAVENOUS at 10:43

## 2022-03-14 RX ADMIN — HEPARIN 500 UNITS: 100 SYRINGE at 13:56

## 2022-03-14 RX ADMIN — OXALIPLATIN 200 MG: 5 INJECTION, SOLUTION, CONCENTRATE INTRAVENOUS at 11:31

## 2022-03-14 RX ADMIN — HEPARIN 500 UNITS: 100 SYRINGE at 13:54

## 2022-03-14 RX ADMIN — DEXTROSE MONOHYDRATE: 50 INJECTION, SOLUTION INTRAVENOUS at 11:29

## 2022-03-14 NOTE — PROGRESS NOTES
Ambulated to infusion area. Here to initiate chemo. Patient had treatment planning, labs and consent last week. Lab results verified. All questions answered. Discussed side effect management and prescriptions related to treatment. Chemo administered as ordered. Call bell within reach. Instructed patient to notify nursing staff with any issues or concerns. Tolerated infusions without incident. Instructed patient to notify office with any questions or unmanageable issues. AVS provided. Discharged in stable condition.
08-Aug-2018 17:30

## 2022-03-23 ENCOUNTER — OFFICE VISIT (OUTPATIENT)
Dept: ONCOLOGY | Age: 82
End: 2022-03-23
Payer: COMMERCIAL

## 2022-03-23 ENCOUNTER — HOSPITAL ENCOUNTER (OUTPATIENT)
Dept: INFUSION THERAPY | Age: 82
Discharge: HOME | End: 2022-03-23
Payer: COMMERCIAL

## 2022-03-23 VITALS
SYSTOLIC BLOOD PRESSURE: 112 MMHG | TEMPERATURE: 98.3 F | HEIGHT: 60 IN | OXYGEN SATURATION: 92 % | HEART RATE: 77 BPM | RESPIRATION RATE: 18 BRPM | DIASTOLIC BLOOD PRESSURE: 57 MMHG | BODY MASS INDEX: 46.21 KG/M2

## 2022-03-23 DIAGNOSIS — C20 RECTAL CANCER (HCC): Primary | ICD-10-CM

## 2022-03-23 LAB
BACTERIA: NEGATIVE /HPF
BILIRUBIN URINE: ABNORMAL MG/DL
BLOOD, URINE: ABNORMAL
CLARITY: CLEAR
COLOR: YELLOW
GLUCOSE, URINE: NEGATIVE MG/DL
KETONES, URINE: NEGATIVE MG/DL
LEUKOCYTE ESTERASE, URINE: NEGATIVE
MUCUS: ABNORMAL HPF
NITRITE URINE, QUANTITATIVE: NEGATIVE
PH, URINE: 7.5 (ref 5–8)
PROTEIN UA: NEGATIVE MG/DL
RBC URINE: ABNORMAL /HPF (ref 0–6)
RENAL EPITHELIAL, UA: <1 /HPF
SPECIFIC GRAVITY UA: 1.01 (ref 1–1.03)
SQUAMOUS EPITHELIAL: <1 /HPF
TRICHOMONAS: ABNORMAL /HPF
UROBILINOGEN, URINE: 1 MG/DL (ref 0.2–1)
WBC UA: <1 /HPF (ref 0–5)

## 2022-03-23 PROCEDURE — 96374 THER/PROPH/DIAG INJ IV PUSH: CPT

## 2022-03-23 PROCEDURE — 96360 HYDRATION IV INFUSION INIT: CPT

## 2022-03-23 PROCEDURE — 6360000002 HC RX W HCPCS: Performed by: INTERNAL MEDICINE

## 2022-03-23 PROCEDURE — 99214 OFFICE O/P EST MOD 30 MIN: CPT | Performed by: INTERNAL MEDICINE

## 2022-03-23 PROCEDURE — 2500000003 HC RX 250 WO HCPCS: Performed by: INTERNAL MEDICINE

## 2022-03-23 PROCEDURE — 96375 TX/PRO/DX INJ NEW DRUG ADDON: CPT

## 2022-03-23 PROCEDURE — 81001 URINALYSIS AUTO W/SCOPE: CPT

## 2022-03-23 PROCEDURE — 96361 HYDRATE IV INFUSION ADD-ON: CPT

## 2022-03-23 PROCEDURE — 2580000003 HC RX 258: Performed by: INTERNAL MEDICINE

## 2022-03-23 RX ORDER — SODIUM CHLORIDE 0.9 % (FLUSH) 0.9 %
5-40 SYRINGE (ML) INJECTION PRN
Status: CANCELLED | OUTPATIENT
Start: 2022-03-23

## 2022-03-23 RX ORDER — DEXAMETHASONE 4 MG/1
4 TABLET ORAL
Qty: 30 TABLET | Refills: 1 | Status: SHIPPED | OUTPATIENT
Start: 2022-03-23 | End: 2022-04-22

## 2022-03-23 RX ORDER — ONDANSETRON 2 MG/ML
8 INJECTION INTRAMUSCULAR; INTRAVENOUS ONCE
Status: CANCELLED
Start: 2022-03-23 | End: 2022-03-23

## 2022-03-23 RX ORDER — ALBUTEROL SULFATE 90 UG/1
4 AEROSOL, METERED RESPIRATORY (INHALATION) PRN
Status: CANCELLED | OUTPATIENT
Start: 2022-04-04

## 2022-03-23 RX ORDER — CAPECITABINE 500 MG/1
1000 TABLET, FILM COATED ORAL 2 TIMES DAILY
COMMUNITY
End: 2022-03-29 | Stop reason: CLARIF

## 2022-03-23 RX ORDER — HEPARIN SODIUM (PORCINE) LOCK FLUSH IV SOLN 100 UNIT/ML 100 UNIT/ML
500 SOLUTION INTRAVENOUS PRN
Status: DISCONTINUED | OUTPATIENT
Start: 2022-03-23 | End: 2022-03-24 | Stop reason: HOSPADM

## 2022-03-23 RX ORDER — 0.9 % SODIUM CHLORIDE 0.9 %
1000 INTRAVENOUS SOLUTION INTRAVENOUS ONCE
Status: CANCELLED | OUTPATIENT
Start: 2022-03-23 | End: 2022-03-23

## 2022-03-23 RX ORDER — SODIUM CHLORIDE 9 MG/ML
5-40 INJECTION INTRAVENOUS PRN
Status: CANCELLED | OUTPATIENT
Start: 2022-04-04

## 2022-03-23 RX ORDER — ONDANSETRON HYDROCHLORIDE 8 MG/1
8 TABLET, FILM COATED ORAL EVERY 8 HOURS PRN
Qty: 30 TABLET | Refills: 3 | Status: SHIPPED | OUTPATIENT
Start: 2022-03-23 | End: 2022-04-02

## 2022-03-23 RX ORDER — SODIUM CHLORIDE 0.9 % (FLUSH) 0.9 %
5-40 SYRINGE (ML) INJECTION PRN
Status: CANCELLED | OUTPATIENT
Start: 2022-04-04

## 2022-03-23 RX ORDER — ONDANSETRON 2 MG/ML
8 INJECTION INTRAMUSCULAR; INTRAVENOUS
Status: CANCELLED | OUTPATIENT
Start: 2022-04-04

## 2022-03-23 RX ORDER — HEPARIN SODIUM (PORCINE) LOCK FLUSH IV SOLN 100 UNIT/ML 100 UNIT/ML
500 SOLUTION INTRAVENOUS PRN
Status: CANCELLED | OUTPATIENT
Start: 2022-03-23

## 2022-03-23 RX ORDER — EPINEPHRINE 1 MG/ML
0.3 INJECTION, SOLUTION, CONCENTRATE INTRAVENOUS PRN
Status: CANCELLED | OUTPATIENT
Start: 2022-04-04

## 2022-03-23 RX ORDER — SODIUM CHLORIDE 9 MG/ML
25 INJECTION, SOLUTION INTRAVENOUS PRN
Status: CANCELLED | OUTPATIENT
Start: 2022-03-23

## 2022-03-23 RX ORDER — MEPERIDINE HYDROCHLORIDE 50 MG/ML
12.5 INJECTION INTRAMUSCULAR; INTRAVENOUS; SUBCUTANEOUS PRN
Status: CANCELLED | OUTPATIENT
Start: 2022-04-04

## 2022-03-23 RX ORDER — DEXTROSE MONOHYDRATE 50 MG/ML
INJECTION, SOLUTION INTRAVENOUS ONCE
Status: CANCELLED | OUTPATIENT
Start: 2022-04-04 | End: 2022-04-04

## 2022-03-23 RX ORDER — SODIUM CHLORIDE 9 MG/ML
INJECTION, SOLUTION INTRAVENOUS CONTINUOUS
Status: CANCELLED | OUTPATIENT
Start: 2022-04-04

## 2022-03-23 RX ORDER — PALONOSETRON 0.05 MG/ML
0.25 INJECTION, SOLUTION INTRAVENOUS ONCE
Status: CANCELLED | OUTPATIENT
Start: 2022-04-04 | End: 2022-04-04

## 2022-03-23 RX ORDER — 0.9 % SODIUM CHLORIDE 0.9 %
1000 INTRAVENOUS SOLUTION INTRAVENOUS ONCE
Status: COMPLETED | OUTPATIENT
Start: 2022-03-23 | End: 2022-03-23

## 2022-03-23 RX ORDER — HEPARIN SODIUM (PORCINE) LOCK FLUSH IV SOLN 100 UNIT/ML 100 UNIT/ML
500 SOLUTION INTRAVENOUS PRN
Status: CANCELLED | OUTPATIENT
Start: 2022-04-04

## 2022-03-23 RX ORDER — DIPHENHYDRAMINE HYDROCHLORIDE 50 MG/ML
50 INJECTION INTRAMUSCULAR; INTRAVENOUS
Status: CANCELLED | OUTPATIENT
Start: 2022-04-04

## 2022-03-23 RX ORDER — ACETAMINOPHEN 325 MG/1
650 TABLET ORAL
Status: CANCELLED | OUTPATIENT
Start: 2022-04-04

## 2022-03-23 RX ORDER — ONDANSETRON 2 MG/ML
8 INJECTION INTRAMUSCULAR; INTRAVENOUS ONCE
Status: COMPLETED | OUTPATIENT
Start: 2022-03-23 | End: 2022-03-23

## 2022-03-23 RX ORDER — SODIUM CHLORIDE 9 MG/ML
25 INJECTION, SOLUTION INTRAVENOUS PRN
Status: CANCELLED | OUTPATIENT
Start: 2022-04-04

## 2022-03-23 RX ADMIN — SODIUM CHLORIDE 1000 ML: 9 INJECTION, SOLUTION INTRAVENOUS at 12:36

## 2022-03-23 RX ADMIN — DEXAMETHASONE SODIUM PHOSPHATE 12 MG: 4 INJECTION, SOLUTION INTRAMUSCULAR; INTRAVENOUS at 12:40

## 2022-03-23 RX ADMIN — HEPARIN 500 UNITS: 100 SYRINGE at 14:05

## 2022-03-23 RX ADMIN — FAMOTIDINE 20 MG: 10 INJECTION, SOLUTION INTRAVENOUS at 12:36

## 2022-03-23 RX ADMIN — ONDANSETRON 8 MG: 2 INJECTION INTRAMUSCULAR; INTRAVENOUS at 12:36

## 2022-03-23 NOTE — PROGRESS NOTES
Patient Name:  Shu Payan  Patient :  1940  Patient MRN:  [id number]     Primary Oncologist: Virgil No MD  Referring Provider: Milo Borges MD     Date of Service: 3/23/2022      Chief Complaint:    Chief Complaint   Patient presents with    Follow-up     Patient Active Problem List:     Hypertension     Obesity     Shortness of breath     Depression     Orthostatic hypotension     Abdominal pain     Anxiety     Chest pain     Ulcer of right lower extremity with fat layer exposed ([address]    HPI:   Shu Payan is a 63-year-old very pleasant female with medical history significant for hypertension, hyperlipidemia, diabetes mellitus, obesity, COPD, pulmonary hypertension, history is of DVT and PE, referred to me on 1/3/2022 for evaluation of her newly diagnosed stage IIIC rectal cancer. She stated that she was initially evaluated by Dr. Deion Matos for newly diagnosed tubular adenoma with at least high-grade dysplasia. She was hospitalized on  for abdominal pain. 2021: CT abdomen pelvis: Changes from cholecystectomy. Common bile duct significantly dilated, mild intrahepatic biliary ductal dilatation.      2021: MRI/MRCP: Diffuse biliary ductal dilatation extending to the ampulla, no choledocholithiasis or mass and could represent post cholecystectomy changes. 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 Left renal angiomyolipoma.      09/15/2021: Colonoscopy: Rectosigmoid ulcerated flat mass located approximately 4-5 cm from the dentate line, measuring about 2.5 cm friable, no active bleeding. Biopsies taken and tattoo placed distally.  Full colonoscopy completed     Pathology revealed at least high grade dysplasia arising in a tubular adenoma, suspicious for invasion into the lamina propria.      10/25/2021: EUS: A 4 cm posterior rectal wall mass, raised and friable, the mass was mildly ulcerated. Mass appears to involve the submucosa with areas of muscularis propria involvement, consistent with T2, although there were a couple of small focal areas that are concerning but not definitive for invasion through the muscularis propria which raised concern for focal T3 disease. Located about 5 cm from the anal verge on the posterior rectal wall. No obvious lymphadenopathy was noted. EUS staging: T2-T3, N0     Pathology: Invasive moderately differentiated colonic adenocarcinoma. CT scan of the chest with contrast done on 11/5/2021 showed no evidence of intrathoracic metastatic disease.     Since she was found to have clinical T2 rectal cancer, she underwent robotic low anterior resection and permanent colostomy placement on 12/6/2021.      Final pathology was consistent with stage IIIC rectal adenocarcinoma. She was subsequently referred to me for further management. PET CT scan done on 1/20/2022 showed intense uptake at T10 associated with a lytic lesion, suspicious for potential osseous metastasis. Intense uptake in the anorectal region likely due to recent surgery. CT guided biopsy of T10 lesion was done on 2/9/22 and pathology showed bone with chronic inflammation and reactive changes. No malignant cells are seen in this specimen. Discussed her case in tumor board and recommendations were to start adjuvant chemo and to re evaluate with repeat scan in 3 to 4 months. Adjuvant chemotherapy with capecitabine and oxaliplatin was started on 3/14/22. On March 23, 2022, she presented to me for follow-up. I have been following her for stage IIIc rectal adenocarcinoma and she is status post robotic low anterior resection and permanent colostomy placement on 12/6/2021. I reviewed with her findings on PET CT scan and pathology from T10 vertebra body. We looked at her PET CT scan together.       Her PET CT scan findings are still concerning for metastatic disease at T10 vertebral, even though biopsy came back negative. Discussed her case in tumor board and recommendations were to start adjuvant chemo and to re evaluate with repeat scan in 3 to 4 months. Adjuvant chemotherapy with capecitabine and oxaliplatin was started on 3/14/22. She is in her first cycle of chemotherapy. She is tolerating chemo fairly okay. She has significant tiredness, fatigue, nausea, loss of appetite and weakness. I recognized that she has significant dehydration on today visit. I will give IV fluid, along with dexamethasone, zofran and pepcid. I will check her interim labs today. I will decrease the dose of oxaliplatin starting from her second cycle. Current cycle, I will decrease the dose of xeloda to 2000 mg in the morning and 1500 mg in the evening. Nausea - I will start zofran 8 mg TID prn. Will monitor it closely. She doesn't have any other significant symptoms at today visit. Past Medical History:     Significant for  1. Hypertension  2. Hyperlipidemia  3. Diabetes mellitus  4. Obesity  5. History of DVT and pulmonary embolism  6. COPD  7. Pulmonary hypertension    Past Surgery History:    Significant for  1. Cholecystectomy  2. Bilateral knee replacement  3. Tonsillectomy  4. Back surgery x 3  5. Bilateral arm surgery  6. Partial hysterectomy  7. Bilateral foot surgery  8. Low anterior resection for rectal cancer and permanent colostomy placement on 12/6/2021    Social History:   She is a former smoker and she quit smoking in 1994. She used to smoke 2 packs a day for approximately 22 years. She denies alcohol drinking or illicit drug abuse. Family History:    Significant for colon cancer in one of her brother, lung cancer in one of her brother, kidney cancer in another brother and throat cancer in her another brother.     Allergies:  Latex   Demerol  Adhesive tape  Percocet  Metoprolol  Naproxen                                                                                          Review of Systems: \"Per interval history; otherwise 10 point ROS is negative. \"  Her energy level is okay and her sleep is stable. She denies fever, chills, night sweat, cough, shortness of breath, chest pain, hemoptysis or palpitations. Her bowel and bladder functions are normal. She doesn't have nausea, vomiting, abdominal pain, diarrhea, constipation, dysuria, loss of appetite or weight loss. She denies neuropathy and she doesn't have bleeding or clotting issues. She denies any pain on today visit. No anxiety or depression. The rest of the systems are unremarkable. Vital Signs: There were no vitals taken for this visit. Physical Exam:  CONSTITUTIONAL: awake, alert, cooperative, no apparent distress   EYES: pupils equal, round and reactive to light, sclera clear, normal conjunctiva  ENT: Normocephalic, without obvious abnormality, atraumatic  NECK: supple, symmetrical, no jugular venous distension, no carotid bruits   HEMATOLOGIC/LYMPHATIC: no cervical, supraclavicular or axillary lymphadenopathy   LUNGS: VBS, no wheezes, clear to auscultation, no crackles, no increased work of breathing, no rhonchi,     CARDIOVASCULAR: regular rate and rhythm, normal S1 and S2, no murmur noted  ABDOMEN: normal bowel sounds x 4, soft, non-distended, non-tender, no masses palpated, no hepatosplenomegaly, left lower quadrant colostomy noted,   MUSCULOSKELETAL: full range of motion noted, tone is normal  NEUROLOGIC: awake, alert, oriented to name, place and time. Motor skills grossly intact. SKIN: appears intact, normal skin color, normal texture, normal turgor, no jaundice.    EXTREMITIES: no cyanosis, b/l leg swelling +, b/l LE edema +, no clubbing,     Labs:  Hematology:  Lab Results   Component Value Date    WBC 5.7 03/11/2022    RBC 4.88 03/11/2022    HGB 13.9 03/11/2022    HCT 43.7 03/11/2022 MCV 89.5 03/11/2022    MCH 28.5 03/11/2022    MCHC 31.8 (L) 03/11/2022    RDW 14.5 03/11/2022     03/11/2022    MPV 10.6 03/11/2022    SEGSPCT 39.3 03/11/2022    EOSRELPCT 2.5 03/11/2022    BASOPCT 2.6 (H) 03/11/2022    LYMPHOPCT 38.8 03/11/2022    MONOPCT 16.8 (H) 03/11/2022    SEGSABS 2.2 03/11/2022    EOSABS 0.1 03/11/2022    BASOSABS 0.2 03/11/2022    LYMPHSABS 2.2 03/11/2022    MONOSABS 1.0 03/11/2022    DIFFTYPE AUTOMATED DIFFERENTIAL 03/11/2022     No results found for: ESR  Chemistry:  Lab Results   Component Value Date     03/11/2022    K 4.2 03/11/2022     03/11/2022    CO2 27 03/11/2022    BUN 15 03/11/2022    CREATININE 0.7 03/11/2022    GLUCOSE 103 (H) 03/11/2022    CALCIUM 10.0 03/11/2022    PROT 7.1 03/11/2022    LABALBU 4.2 03/11/2022    BILITOT 0.4 03/11/2022    ALKPHOS 207 (H) 03/11/2022    AST 32 03/11/2022    ALT 25 03/11/2022    LABGLOM >60 03/11/2022    GFRAA >60 03/11/2022     No results found for: MMA, LDH, HOMOCYSTEINE  No components found for: LD  Lab Results   Component Value Date    TSHHS 2.610 10/26/2018    T4FREE 1.10 06/18/2015    FT3 2.4 06/18/2015     Immunology:  Lab Results   Component Value Date    PROT 7.1 03/11/2022     No results found for: Yuridia Right, KLFLCR  No results found for: B2M  Coagulation Panel:  Lab Results   Component Value Date    PROTIME 11.7 02/01/2022    INR 0.91 02/01/2022    APTT 23.1 (L) 02/01/2022     Anemia Panel:  Lab Results   Component Value Date    ZTWDSDOF03 5688 (H) 03/09/2013    FOLATE >24.0 03/09/2013     Tumor Markers:  Lab Results   Component Value Date    CEA 2.0 01/03/2022     Observations:  No data recorded     Assessment   Stage IIIC rectal adenocarcinoma    Plan:                                                                                                Jasmina Gómez is a 42-year-old very pleasant female who was initially evaluated by Dr. Trena Torres for newly diagnosed tubular adenoma with at least high-grade dysplasia.      EUS on 10/25/2021 showed a 4 cm posterior rectal wall mass, raised and friable, the mass was mildly ulcerated. Mass appears to involve the submucosa with areas of muscularis propria involvement, consistent with T2, although there were a couple of small focal areas that are concerning but not definitive for invasion through the muscularis propria which raised concern for focal T3 disease. Located about 5 cm from the anal verge on the posterior rectal wall. No obvious lymphadenopathy was noted. EUS staging: T2-T3, N0     Pathology: Invasive moderately differentiated colonic adenocarcinoma     Since she was found to have clinical T2 rectal cancer, she underwent robotic low anterior resection and permanent colostomy placement on 12/6/2021.      Final pathology was consistent with stage IIIC rectal adenocarcinoma. PET CT scan done on 1/20/2022 showed intense uptake at T10 associated with a lytic lesion, suspicious for potential osseous meta stasis. Intense uptake in the anorectal region likely due to recent surgery. CT guided biopsy of T10 lesion was done on 2/9/22 and pathology showed bone with chronic inflammation and reactive changes. No malignant cells are seen in this specimen. Discussed her case in tumor board and recommendations were to start adjuvant chemo and to re evaluate with repeat scan in 3 to 4 months. Adjuvant chemotherapy with capecitabine and oxaliplatin was started on 3/14/22. On March 23, 2022, she presented to me for follow-up. I have been following her for stage IIIc rectal adenocarcinoma and she is status post robotic low anterior resection and permanent colostomy placement on 12/6/2021. I reviewed with her findings on PET CT scan and pathology from T10 vertebra body. We looked at her PET CT scan together. Her PET CT scan findings are still concerning for metastatic disease at T10 vertebral, even though biopsy came back negative.      Discussed her case in

## 2022-03-23 NOTE — PROGRESS NOTES
Assisted to infusion area, here today for hydration therapy. Patient c/o feeling very weak and tired since treatment last week. Right chest mediport accessed, positive blood return noted. Fluids administered as ordered. Call light within reach. Tolerated infusion without incident. Discharge instructions provided. Patient RTC 04/04 for next chemo infusion.

## 2022-03-23 NOTE — PROGRESS NOTES
MA Rooming Questions  Patient: Quincy Lechuga  MRN: [id number]    Date: 3/23/2022        1. Do you have any new issues? yes - pain in abdomen that is intermittent, doesn't feel good, states \"she is feeling off\". Weak/tired, nauseous, bloating, achey all over, constipated a lot, urinating often and is a little painful. 2. Do you need any refills on medications? yes - Xeloda    3. Have you had any imaging done since your last visit?   no    4. Have you been hospitalized or seen in the emergency room since your last visit here?   no    5. Did the patient have a depression screening completed today?  No    No data recorded     PHQ-9 Given to (if applicable):               PHQ-9 Score (if applicable):                     [] Positive     []  Negative              Does question #9 need addressed (if applicable)                     [] Yes    []  No               Shu Edmondson, [facility]

## 2022-03-24 ENCOUNTER — CLINICAL DOCUMENTATION (OUTPATIENT)
Dept: ONCOLOGY | Age: 82
End: 2022-03-24

## 2022-03-24 NOTE — PROGRESS NOTES
UA results from 03/23/2022 normal. No sign of UTI so patient does not need to take ATB per physician. Called patient at [phone] and spoke with patient's DIL to notify. Voices understanding.

## 2022-03-25 ENCOUNTER — TELEPHONE (OUTPATIENT)
Dept: ONCOLOGY | Age: 82
End: 2022-03-25

## 2022-03-25 NOTE — TELEPHONE ENCOUNTER
Called patient's daughter, Bennie Ortiz at [phone] per request to address patient symptoms. Daughter states patient has been fluctuating with fever between 100 - 101 since last night. Patient denies chills, aches, SOB. Patient does have a runny nose and daughter gave dose of mucinex last night. Advised to try tylenol as directed on bottle to reduce fever. Voices understanding. Daughter aware to contact office if fever continues or go to ER if symptoms worsen.

## 2022-03-25 NOTE — TELEPHONE ENCOUNTER
Patient daughter called and states patient has been running a fever of 100-101 since yesterday.   Clear nasal drainage no other symptoms

## 2022-03-29 ENCOUNTER — CLINICAL DOCUMENTATION (OUTPATIENT)
Dept: ONCOLOGY | Age: 82
End: 2022-03-29

## 2022-03-29 DIAGNOSIS — C20 RECTAL CANCER (HCC): Primary | ICD-10-CM

## 2022-03-29 RX ORDER — CAPECITABINE 500 MG/1
TABLET, FILM COATED ORAL
Qty: 98 TABLET | Refills: 5 | Status: SHIPPED | OUTPATIENT
Start: 2022-03-29 | End: 2022-03-29 | Stop reason: CLARIF

## 2022-03-29 RX ORDER — CAPECITABINE 500 MG/1
TABLET, FILM COATED ORAL
Qty: 112 TABLET | Refills: 5 | Status: ACTIVE | OUTPATIENT
Start: 2022-03-29

## 2022-03-29 NOTE — PROGRESS NOTES
Spoke with [facility] pharmacy. Discussed Xeloda order with staff. Informed that patient needed a new order shipped. Informed that Dr Ronald Arora had reduced the dose to 2000 mg in the am and 1500 mg in the pm for the 1st cycle. New script sent to pharmacy. Called patient and patient's daughter Josr Castro. Informed that they will have to request a refill at [facility] # [phone]. Dennise states that she will call the pharmacy for the refill.

## 2022-03-29 NOTE — PROGRESS NOTES
[facility] Update    Date: 03/29/22    Patient receives free drug from . Script routed to [facility].

## 2022-03-29 NOTE — PROGRESS NOTES
[facility] Update    Date: 03/29/22    Patient's prescription benefits are being verified for coverage. Status update to follow as soon as possible. Please call us with any questions at [phone] opt.  6.

## 2022-04-04 ENCOUNTER — HOSPITAL ENCOUNTER (OUTPATIENT)
Dept: INFUSION THERAPY | Age: 82
Discharge: HOME | End: 2022-04-04
Payer: COMMERCIAL

## 2022-04-04 VITALS
DIASTOLIC BLOOD PRESSURE: 79 MMHG | TEMPERATURE: 97.5 F | OXYGEN SATURATION: 94 % | WEIGHT: 232.8 LBS | SYSTOLIC BLOOD PRESSURE: 139 MMHG | HEIGHT: 60 IN | HEART RATE: 76 BPM | BODY MASS INDEX: 45.71 KG/M2

## 2022-04-04 DIAGNOSIS — C20 RECTAL CANCER (HCC): Primary | ICD-10-CM

## 2022-04-04 LAB
ALBUMIN SERPL-MCNC: 4.1 GM/DL (ref 3.4–5)
ALP BLD-CCNC: 255 IU/L (ref 40–128)
ALT SERPL-CCNC: 34 U/L (ref 10–40)
ANION GAP SERPL CALCULATED.3IONS-SCNC: 14 MMOL/L (ref 4–16)
AST SERPL-CCNC: 28 IU/L (ref 15–37)
BASOPHILS ABSOLUTE: 0.1 K/CU MM
BASOPHILS RELATIVE PERCENT: 1.2 % (ref 0–1)
BILIRUB SERPL-MCNC: 0.5 MG/DL (ref 0–1)
BUN BLDV-MCNC: 14 MG/DL (ref 6–23)
CALCIUM SERPL-MCNC: 9.5 MG/DL (ref 8.3–10.6)
CHLORIDE BLD-SCNC: 101 MMOL/L (ref 99–110)
CO2: 26 MMOL/L (ref 21–32)
CREAT SERPL-MCNC: 0.7 MG/DL (ref 0.6–1.1)
DIFFERENTIAL TYPE: ABNORMAL
EOSINOPHILS ABSOLUTE: 0.1 K/CU MM
EOSINOPHILS RELATIVE PERCENT: 1.8 % (ref 0–3)
GFR AFRICAN AMERICAN: >60 ML/MIN/1.73M2
GFR AFRICAN AMERICAN: >60 ML/MIN/1.73M2
GFR NON-AFRICAN AMERICAN: >60 ML/MIN/1.73M2
GFR NON-AFRICAN AMERICAN: >60 ML/MIN/1.73M2
GLUCOSE BLD-MCNC: 89 MG/DL (ref 70–99)
GLUCOSE BLD-MCNC: 93 MG/DL (ref 70–99)
HCT VFR BLD CALC: 41.9 % (ref 37–47)
HEMOGLOBIN: 13.5 GM/DL (ref 12.5–16)
LYMPHOCYTES ABSOLUTE: 2.1 K/CU MM
LYMPHOCYTES RELATIVE PERCENT: 34.9 % (ref 24–44)
MCH RBC QN AUTO: 29.4 PG (ref 27–31)
MCHC RBC AUTO-ENTMCNC: 32.2 % (ref 32–36)
MCV RBC AUTO: 91.3 FL (ref 78–100)
MONOCYTES ABSOLUTE: 1.3 K/CU MM
MONOCYTES RELATIVE PERCENT: 21.2 % (ref 0–4)
PDW BLD-RTO: 17.4 % (ref 11.7–14.9)
PLATELET # BLD: 205 K/CU MM (ref 140–440)
PMV BLD AUTO: 9.7 FL (ref 7.5–11.1)
POC BUN: 14 MG/DL (ref 8–26)
POC CALCIUM: 1.21 MMOL/L (ref 1.12–1.32)
POC CHLORIDE: 102 MMOL/L (ref 98–109)
POC CO2: 31 MMOL/L (ref 21–32)
POC CREATININE: 0.7 MG/DL (ref 0.6–1.1)
POTASSIUM SERPL-SCNC: 3.8 MMOL/L (ref 3.5–4.5)
POTASSIUM SERPL-SCNC: 4.1 MMOL/L (ref 3.5–5.1)
RBC # BLD: 4.59 M/CU MM (ref 4.2–5.4)
SEGMENTED NEUTROPHILS ABSOLUTE COUNT: 2.5 K/CU MM
SEGMENTED NEUTROPHILS RELATIVE PERCENT: 40.9 % (ref 36–66)
SODIUM BLD-SCNC: 141 MMOL/L (ref 135–145)
SODIUM BLD-SCNC: 143 MMOL/L (ref 138–146)
SOURCE, BLOOD GAS: NORMAL
TOTAL PROTEIN: 6.7 GM/DL (ref 6.4–8.2)
WBC # BLD: 6 K/CU MM (ref 4–10.5)

## 2022-04-04 PROCEDURE — 96413 CHEMO IV INFUSION 1 HR: CPT

## 2022-04-04 PROCEDURE — 2580000003 HC RX 258: Performed by: INTERNAL MEDICINE

## 2022-04-04 PROCEDURE — 96375 TX/PRO/DX INJ NEW DRUG ADDON: CPT

## 2022-04-04 PROCEDURE — 85025 COMPLETE CBC W/AUTO DIFF WBC: CPT

## 2022-04-04 PROCEDURE — 80053 COMPREHEN METABOLIC PANEL: CPT

## 2022-04-04 PROCEDURE — 6360000002 HC RX W HCPCS: Performed by: INTERNAL MEDICINE

## 2022-04-04 PROCEDURE — 96415 CHEMO IV INFUSION ADDL HR: CPT

## 2022-04-04 RX ORDER — HEPARIN SODIUM (PORCINE) LOCK FLUSH IV SOLN 100 UNIT/ML 100 UNIT/ML
500 SOLUTION INTRAVENOUS PRN
Status: DISCONTINUED | OUTPATIENT
Start: 2022-04-04 | End: 2022-04-05 | Stop reason: HOSPADM

## 2022-04-04 RX ORDER — PALONOSETRON 0.05 MG/ML
0.25 INJECTION, SOLUTION INTRAVENOUS ONCE
Status: COMPLETED | OUTPATIENT
Start: 2022-04-04 | End: 2022-04-04

## 2022-04-04 RX ORDER — DEXTROSE MONOHYDRATE 50 MG/ML
INJECTION, SOLUTION INTRAVENOUS ONCE
Status: DISCONTINUED | OUTPATIENT
Start: 2022-04-04 | End: 2022-04-05 | Stop reason: HOSPADM

## 2022-04-04 RX ADMIN — DEXTROSE MONOHYDRATE: 50 INJECTION, SOLUTION INTRAVENOUS at 11:31

## 2022-04-04 RX ADMIN — HEPARIN 500 UNITS: 100 SYRINGE at 13:52

## 2022-04-04 RX ADMIN — OXALIPLATIN 170 MG: 5 INJECTION, SOLUTION INTRAVENOUS at 11:31

## 2022-04-04 RX ADMIN — PALONOSETRON 0.25 MG: 0.25 INJECTION, SOLUTION INTRAVENOUS at 10:51

## 2022-04-04 RX ADMIN — DEXAMETHASONE SODIUM PHOSPHATE 12 MG: 4 INJECTION, SOLUTION INTRA-ARTICULAR; INTRALESIONAL; INTRAMUSCULAR; INTRAVENOUS; SOFT TISSUE at 10:54

## 2022-04-04 ASSESSMENT — PAIN SCALES - GENERAL: PAINLEVEL_OUTOF10: 6

## 2022-04-04 ASSESSMENT — PAIN DESCRIPTION - LOCATION: LOCATION: BACK

## 2022-04-04 ASSESSMENT — PAIN DESCRIPTION - PAIN TYPE: TYPE: CHRONIC PAIN

## 2022-04-04 NOTE — PROGRESS NOTES
Assisted to infusion area, here today for chemotherapy. No concerns at this time. Right chest mediport accessed, positive blood return noted. Labs drawn. Labs within defined limits. Chemo administered as ordered. Call light within reach. Tolerated infusion without incident. Discharge instructions provided. Patient RTC 04/11 for OV with Dr. Jen Vaughan.    Status appropriately assessed and documented. All required labs and results reviewed. Treatment approved by provider. Treatment orders and medications verified by 2 Registered Nurses where applicable. Treatment plan was confirmed with patient prior to administration, and educated the need to report any treatment-related symptoms    Prior to administration, when applicable, the following 8 elements of medication administration were reviewed with 2nd Registered Nurse prior to dosing: drug name, drug dose, infusion volume when prepared in a syringe, rate of administration, expiration dates and/or times, appearance and integrity of drug(s), and rate of pump for infusion. The 5 rights of medication administration have been verified.

## 2022-04-10 NOTE — PROGRESS NOTES
Patient Name:  Sanju Brady  Patient :  1940  Patient MRN:  [id number]     Primary Oncologist: Avis Omalley MD  Referring Provider: Henry Schmidt MD     Date of Service: 2022      Chief Complaint:    Chief Complaint   Patient presents with    Follow-up     Patient Active Problem List:     Hypertension     Obesity     Shortness of breath     Depression     Orthostatic hypotension     Abdominal pain     Anxiety     Chest pain     Ulcer of right lower extremity with fat layer exposed ([address]    HPI:   Sanju Brady is a 82-year-old very pleasant female with medical history significant for hypertension, hyperlipidemia, diabetes mellitus, obesity, COPD, pulmonary hypertension, history is of DVT and PE, referred to me on 1/3/2022 for evaluation of her newly diagnosed stage IIIC rectal cancer. She stated that she was initially evaluated by Dr. Patricia Lopez for newly diagnosed tubular adenoma with at least high-grade dysplasia. She was hospitalized on  for abdominal pain. 2021: CT abdomen pelvis: Changes from cholecystectomy. Common bile duct significantly dilated, mild intrahepatic biliary ductal dilatation.      2021: MRI/MRCP: Diffuse biliary ductal dilatation extending to the ampulla, no choledocholithiasis or mass and could represent post cholecystectomy changes. 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 Left renal angiomyolipoma.      09/15/2021: Colonoscopy: Rectosigmoid ulcerated flat mass located approximately 4-5 cm from the dentate line, measuring about 2.5 cm friable, no active bleeding. Biopsies taken and tattoo placed distally.  Full colonoscopy completed     Pathology revealed at least high grade dysplasia arising in a tubular adenoma, suspicious for invasion into the lamina propria.      10/25/2021: EUS: A 4 cm posterior rectal wall mass, raised and friable, the mass was mildly ulcerated. Mass appears to involve the submucosa with areas of muscularis propria involvement, consistent with T2, although there were a couple of small focal areas that are concerning but not definitive for invasion through the muscularis propria which raised concern for focal T3 disease. Located about 5 cm from the anal verge on the posterior rectal wall. No obvious lymphadenopathy was noted. EUS staging: T2-T3, N0     Pathology: Invasive moderately differentiated colonic adenocarcinoma. CT scan of the chest with contrast done on 11/5/2021 showed no evidence of intrathoracic metastatic disease.     Since she was found to have clinical T2 rectal cancer, she underwent robotic low anterior resection and permanent colostomy placement on 12/6/2021.      Final pathology was consistent with stage IIIC rectal adenocarcinoma. She was subsequently referred to me for further management. PET CT scan done on 1/20/2022 showed intense uptake at T10 associated with a lytic lesion, suspicious for potential osseous metastasis. Intense uptake in the anorectal region likely due to recent surgery. CT guided biopsy of T10 lesion was done on 2/9/22 and pathology showed bone with chronic inflammation and reactive changes. No malignant cells are seen in this specimen. Discussed her case in tumor board and recommendations were to start adjuvant chemo and to re evaluate with repeat scan in 3 to 4 months. Adjuvant chemotherapy with capecitabine and oxaliplatin was started on 3/14/22. On April 11, 2022, she presented to me for follow-up. I have been following her for stage IIIc rectal adenocarcinoma and she is status post robotic low anterior resection and permanent colostomy placement on 12/6/2021. I reviewed with her findings on PET CT scan and pathology from T10 vertebra body. We looked at her PET CT scan together.       Her PET CT scan findings are still concerning for metastatic disease at T10 vertebral, even though biopsy came back negative. Discussed her case in tumor board and recommendations were to start adjuvant chemo and to re evaluate with repeat scan in 3 to 4 months. Adjuvant chemotherapy with capecitabine and oxaliplatin was started on 3/14/22. She is in her second cycle of chemotherapy. She is tolerating chemo fairly okay. She has significant tiredness, fatigue, nausea, loss of appetite and weakness. I decreased the dose of oxaliplatin starting from her second cycle. Since she still has significant issue with chemo, I will decrease the dose of xeloda to 2000 mg in the morning and 1500 mg in the evening (she is currently on 2000 mg BID) starting from today. Will see her back on 4/25/22 when she is going to start her third cycle. Based on how she does at that time, I will consider to further adjust the dose of chemo if needed. Nausea - I will start zofran 8 mg TID prn. Will monitor it closely. She doesn't have any other significant symptoms at today visit. Past Medical History:     Significant for  1. Hypertension  2. Hyperlipidemia  3. Diabetes mellitus  4. Obesity  5. History of DVT and pulmonary embolism  6. COPD  7. Pulmonary hypertension    Past Surgery History:    Significant for  1. Cholecystectomy  2. Bilateral knee replacement  3. Tonsillectomy  4. Back surgery x 3  5. Bilateral arm surgery  6. Partial hysterectomy  7. Bilateral foot surgery  8. Low anterior resection for rectal cancer and permanent colostomy placement on 12/6/2021    Social History:   She is a former smoker and she quit smoking in 1994. She used to smoke 2 packs a day for approximately 22 years. She denies alcohol drinking or illicit drug abuse.     Family History:    Significant for colon cancer in one of her brother, lung cancer in one of her brother, kidney cancer in another brother and throat cancer in her another brother. Allergies:  Latex   Demerol  Adhesive tape  Percocet  Metoprolol  Naproxen                                                                                          Review of Systems: \"Per interval history; otherwise 10 point ROS is negative. \"  Her energy level is fair and her sleep is good. She doesn't have fever, chills, night sweat, cough, shortness of breath, chest pain, hemoptysis or palpitations. Her bowel and bladder functions are normal. She denies nausea, vomiting, abdominal pain, diarrhea, constipation, dysuria, loss of appetite or weight loss. She doesn't have neuropathy and she denies bleeding or clotting issues. She doesn't have any pain on today visit. Denies anxiety or depression. The rest of the systems are unremarkable. Vital Signs: BP (!) 98/57 (Site: Left Upper Arm, Position: Sitting, Cuff Size: Large Adult)   Pulse 85   Temp 96.1 °F (35.6 °C) (Temporal)   Resp 18   Ht 5' (1.524 m)   Wt 230 lb (104.3 kg) Comment: pt stated weight  SpO2 93%   BMI 44.92 kg/m²      Physical Exam:  CONSTITUTIONAL: awake, alert, cooperative, no apparent distress   EYES: pupils equal, round and reactive to light, sclera clear, normal conjunctiva  ENT: Normocephalic, without obvious abnormality, atraumatic  NECK: supple, symmetrical, no jugular venous distension, no carotid bruits   HEMATOLOGIC/LYMPHATIC: no cervical, supraclavicular or axillary lymphadenopathy   LUNGS: VBS, no wheezes, no increased work of breathing, no rhonchi, clear to auscultation, no crackles,     CARDIOVASCULAR: regular rate and rhythm, normal S1 and S2, no murmur noted  ABDOMEN: normal bowel sounds x 4, soft, non-distended, non-tender, no masses palpated, no hepatosplenomegaly, left lower quadrant colostomy noted,   MUSCULOSKELETAL: full range of motion noted, tone is normal  NEUROLOGIC: awake, alert, oriented to name, place and time. Motor skills grossly intact.    SKIN: appears intact, normal skin color, normal texture, normal turgor, no jaundice.    EXTREMITIES: no cyanosis, no clubbing, b/l leg swelling +, b/l LE edema +,     Labs:  Hematology:  Lab Results   Component Value Date    WBC 6.0 04/04/2022    RBC 4.59 04/04/2022    HGB 13.5 04/04/2022    HCT 41.9 04/04/2022    MCV 91.3 04/04/2022    MCH 29.4 04/04/2022    MCHC 32.2 04/04/2022    RDW 17.4 (H) 04/04/2022     04/04/2022    MPV 9.7 04/04/2022    SEGSPCT 40.9 04/04/2022    EOSRELPCT 1.8 04/04/2022    BASOPCT 1.2 (H) 04/04/2022    LYMPHOPCT 34.9 04/04/2022    MONOPCT 21.2 (H) 04/04/2022    SEGSABS 2.5 04/04/2022    EOSABS 0.1 04/04/2022    BASOSABS 0.1 04/04/2022    LYMPHSABS 2.1 04/04/2022    MONOSABS 1.3 04/04/2022    DIFFTYPE AUTOMATED DIFFERENTIAL 04/04/2022     No results found for: ESR  Chemistry:  Lab Results   Component Value Date     04/04/2022    K 3.8 04/04/2022     04/04/2022    CO2 26 04/04/2022    BUN 14 04/04/2022    CREATININE 0.7 04/04/2022    GLUCOSE 93 04/04/2022    CALCIUM 9.5 04/04/2022    PROT 6.7 04/04/2022    LABALBU 4.1 04/04/2022    BILITOT 0.5 04/04/2022    ALKPHOS 255 (H) 04/04/2022    AST 28 04/04/2022    ALT 34 04/04/2022    LABGLOM >60 04/04/2022    GFRAA >60 04/04/2022    POCCA 1.21 04/04/2022    POCGLU 89 04/04/2022     No results found for: MMA, LDH, HOMOCYSTEINE  No components found for: LD  Lab Results   Component Value Date    TSHHS 2.610 10/26/2018    T4FREE 1.10 06/18/2015    FT3 2.4 06/18/2015     Immunology:  Lab Results   Component Value Date    PROT 6.7 04/04/2022     No results found for: Dante Vivar, KLFLCR  No results found for: B2M  Coagulation Panel:  Lab Results   Component Value Date    PROTIME 11.7 02/01/2022    INR 0.91 02/01/2022    APTT 23.1 (L) 02/01/2022     Anemia Panel:  Lab Results   Component Value Date    UJAXSTKM14 4170 (H) 03/09/2013    FOLATE >24.0 03/09/2013     Tumor Markers:  Lab Results   Component Value Date    CEA 2.0 01/03/2022     Observations:  No data recorded Assessment   Stage IIIC rectal adenocarcinoma    Plan:                                                                                                Dylan Hartman is a 74-year-old very pleasant female who was initially evaluated by Dr. Ilene Olsen for newly diagnosed tubular adenoma with at least high-grade dysplasia.      EUS on 10/25/2021 showed a 4 cm posterior rectal wall mass, raised and friable, the mass was mildly ulcerated. Mass appears to involve the submucosa with areas of muscularis propria involvement, consistent with T2, although there were a couple of small focal areas that are concerning but not definitive for invasion through the muscularis propria which raised concern for focal T3 disease. Located about 5 cm from the anal verge on the posterior rectal wall. No obvious lymphadenopathy was noted. EUS staging: T2-T3, N0     Pathology: Invasive moderately differentiated colonic adenocarcinoma     Since she was found to have clinical T2 rectal cancer, she underwent robotic low anterior resection and permanent colostomy placement on 12/6/2021.      Final pathology was consistent with stage IIIC rectal adenocarcinoma. PET CT scan done on 1/20/2022 showed intense uptake at T10 associated with a lytic lesion, suspicious for potential osseous meta stasis. Intense uptake in the anorectal region likely due to recent surgery. CT guided biopsy of T10 lesion was done on 2/9/22 and pathology showed bone with chronic inflammation and reactive changes. No malignant cells are seen in this specimen. Discussed her case in tumor board and recommendations were to start adjuvant chemo and to re evaluate with repeat scan in 3 to 4 months. Adjuvant chemotherapy with capecitabine and oxaliplatin was started on 3/14/22. On April 11, 2022, she presented to me for follow-up.   I have been following her for stage IIIc rectal adenocarcinoma and she is status post robotic low anterior resection and permanent colostomy placement on 12/6/2021. I reviewed with her findings on PET CT scan and pathology from T10 vertebra body. We looked at her PET CT scan together. Her PET CT scan findings are still concerning for metastatic disease at T10 vertebral, even though biopsy came back negative. Discussed her case in tumor board and recommendations were to start adjuvant chemo and to re evaluate with repeat scan in 3 to 4 months. Adjuvant chemotherapy with capecitabine and oxaliplatin was started on 3/14/22. She is in her second cycle of chemotherapy. She is tolerating chemo fairly okay. She has significant tiredness, fatigue, nausea, loss of appetite and weakness. I decreased the dose of oxaliplatin starting from her second cycle. Since she still has significant issue with chemo, I will decrease the dose of xeloda to 2000 mg in the morning and 1500 mg in the evening (she is currently on 2000 mg BID) starting from today. Will see her back on 4/25/22 when she is going to start her third cycle. Based on how she does at that time, I will consider to further adjust the dose of chemo if needed. Nausea - I will start zofran 8 mg TID prn. Will monitor it closely. I answered all her questions and concerns for today. I asked her to follow up with primary care physician on regular basis. Recent imaging and labs were reviewed and discussed with the patient.

## 2022-04-11 ENCOUNTER — OFFICE VISIT (OUTPATIENT)
Dept: ONCOLOGY | Age: 82
End: 2022-04-11
Payer: COMMERCIAL

## 2022-04-11 ENCOUNTER — HOSPITAL ENCOUNTER (OUTPATIENT)
Dept: INFUSION THERAPY | Age: 82
Discharge: HOME | End: 2022-04-11

## 2022-04-11 VITALS
RESPIRATION RATE: 18 BRPM | SYSTOLIC BLOOD PRESSURE: 98 MMHG | BODY MASS INDEX: 45.16 KG/M2 | DIASTOLIC BLOOD PRESSURE: 57 MMHG | WEIGHT: 230 LBS | HEIGHT: 60 IN | TEMPERATURE: 96.1 F | HEART RATE: 85 BPM | OXYGEN SATURATION: 93 %

## 2022-04-11 DIAGNOSIS — C20 RECTAL CANCER (HCC): Primary | ICD-10-CM

## 2022-04-11 PROCEDURE — 99214 OFFICE O/P EST MOD 30 MIN: CPT | Performed by: INTERNAL MEDICINE

## 2022-04-11 RX ORDER — TOLTERODINE 4 MG/1
CAPSULE, EXTENDED RELEASE ORAL
COMMUNITY
Start: 2022-04-05

## 2022-04-11 RX ORDER — TRAZODONE HYDROCHLORIDE 50 MG/1
TABLET ORAL
Qty: 30 TABLET | Refills: 0 | Status: SHIPPED | OUTPATIENT
Start: 2022-04-11 | End: 2022-04-25 | Stop reason: SDUPTHER

## 2022-04-11 NOTE — PROGRESS NOTES
MA Rooming Questions  Patient: Makayla Fox  MRN: [id number]    Date: 4/11/2022        1. Do you have any new issues? yes - b/p low, has had a headache since yesterday, states she doesn't remember falling asleep, and that Trazodone doesn't help, increase dose?, did have numbeness in right arm and right leg. 2. Do you need any refills on medications? yes - Trazodone     3. Have you had any imaging done since your last visit?   no    4. Have you been hospitalized or seen in the emergency room since your last visit here?   no    5. Did the patient have a depression screening completed today?  No    No data recorded     PHQ-9 Given to (if applicable):               PHQ-9 Score (if applicable):                     [] Positive     []  Negative              Does question #9 need addressed (if applicable)                     [] Yes    []  No               Luz Maria Case CMA

## 2022-04-20 DIAGNOSIS — C20 RECTAL CANCER (HCC): Primary | ICD-10-CM

## 2022-04-25 ENCOUNTER — HOSPITAL ENCOUNTER (OUTPATIENT)
Dept: INFUSION THERAPY | Age: 82
Discharge: HOME | End: 2022-04-25
Payer: COMMERCIAL

## 2022-04-25 ENCOUNTER — OFFICE VISIT (OUTPATIENT)
Dept: ONCOLOGY | Age: 82
End: 2022-04-25
Payer: COMMERCIAL

## 2022-04-25 VITALS
HEIGHT: 60 IN | BODY MASS INDEX: 44.1 KG/M2 | HEART RATE: 78 BPM | OXYGEN SATURATION: 97 % | SYSTOLIC BLOOD PRESSURE: 112 MMHG | DIASTOLIC BLOOD PRESSURE: 72 MMHG | TEMPERATURE: 97 F | WEIGHT: 224.6 LBS

## 2022-04-25 DIAGNOSIS — C20 RECTAL CANCER (HCC): Primary | ICD-10-CM

## 2022-04-25 LAB
ALBUMIN SERPL-MCNC: 3.9 GM/DL (ref 3.4–5)
ALP BLD-CCNC: 370 IU/L (ref 40–128)
ALT SERPL-CCNC: 70 U/L (ref 10–40)
ANION GAP SERPL CALCULATED.3IONS-SCNC: 11 MMOL/L (ref 4–16)
AST SERPL-CCNC: 64 IU/L (ref 15–37)
BASOPHILS ABSOLUTE: 0.1 K/CU MM
BASOPHILS RELATIVE PERCENT: 1.5 % (ref 0–1)
BILIRUB SERPL-MCNC: 1.6 MG/DL (ref 0–1)
BUN BLDV-MCNC: 17 MG/DL (ref 6–23)
CALCIUM SERPL-MCNC: 9.6 MG/DL (ref 8.3–10.6)
CHLORIDE BLD-SCNC: 100 MMOL/L (ref 99–110)
CO2: 28 MMOL/L (ref 21–32)
CREAT SERPL-MCNC: 0.6 MG/DL (ref 0.6–1.1)
DIFFERENTIAL TYPE: ABNORMAL
EOSINOPHILS ABSOLUTE: 0.1 K/CU MM
EOSINOPHILS RELATIVE PERCENT: 1 % (ref 0–3)
GFR AFRICAN AMERICAN: >60 ML/MIN/1.73M2
GFR AFRICAN AMERICAN: >60 ML/MIN/1.73M2
GFR NON-AFRICAN AMERICAN: >60 ML/MIN/1.73M2
GFR NON-AFRICAN AMERICAN: >60 ML/MIN/1.73M2
GLUCOSE BLD-MCNC: 130 MG/DL (ref 70–99)
GLUCOSE BLD-MCNC: 131 MG/DL (ref 70–99)
HCT VFR BLD CALC: 40 % (ref 37–47)
HEMOGLOBIN: 12.9 GM/DL (ref 12.5–16)
LYMPHOCYTES ABSOLUTE: 1.5 K/CU MM
LYMPHOCYTES RELATIVE PERCENT: 29.5 % (ref 24–44)
MCH RBC QN AUTO: 30.4 PG (ref 27–31)
MCHC RBC AUTO-ENTMCNC: 32.3 % (ref 32–36)
MCV RBC AUTO: 94.3 FL (ref 78–100)
MONOCYTES ABSOLUTE: 1.1 K/CU MM
MONOCYTES RELATIVE PERCENT: 21.2 % (ref 0–4)
PDW BLD-RTO: 20.6 % (ref 11.7–14.9)
PLATELET # BLD: 159 K/CU MM (ref 140–440)
PMV BLD AUTO: 9.9 FL (ref 7.5–11.1)
POC BUN: 14 MG/DL (ref 8–26)
POC CALCIUM: 1.24 MMOL/L (ref 1.12–1.32)
POC CHLORIDE: 104 MMOL/L (ref 98–109)
POC CO2: 31 MMOL/L (ref 21–32)
POC CREATININE: 0.7 MG/DL (ref 0.6–1.1)
POTASSIUM SERPL-SCNC: 3 MMOL/L (ref 3.5–4.5)
POTASSIUM SERPL-SCNC: 3.3 MMOL/L (ref 3.5–5.1)
RBC # BLD: 4.24 M/CU MM (ref 4.2–5.4)
SEGMENTED NEUTROPHILS ABSOLUTE COUNT: 2.4 K/CU MM
SEGMENTED NEUTROPHILS RELATIVE PERCENT: 46.8 % (ref 36–66)
SODIUM BLD-SCNC: 139 MMOL/L (ref 135–145)
SODIUM BLD-SCNC: 144 MMOL/L (ref 138–146)
SOURCE, BLOOD GAS: ABNORMAL
TOTAL PROTEIN: 6.6 GM/DL (ref 6.4–8.2)
WBC # BLD: 5.2 K/CU MM (ref 4–10.5)

## 2022-04-25 PROCEDURE — 96413 CHEMO IV INFUSION 1 HR: CPT

## 2022-04-25 PROCEDURE — 2580000003 HC RX 258: Performed by: INTERNAL MEDICINE

## 2022-04-25 PROCEDURE — 96375 TX/PRO/DX INJ NEW DRUG ADDON: CPT

## 2022-04-25 PROCEDURE — 6360000002 HC RX W HCPCS: Performed by: INTERNAL MEDICINE

## 2022-04-25 PROCEDURE — 85025 COMPLETE CBC W/AUTO DIFF WBC: CPT

## 2022-04-25 PROCEDURE — 96374 THER/PROPH/DIAG INJ IV PUSH: CPT

## 2022-04-25 PROCEDURE — 96415 CHEMO IV INFUSION ADDL HR: CPT

## 2022-04-25 PROCEDURE — 99214 OFFICE O/P EST MOD 30 MIN: CPT | Performed by: INTERNAL MEDICINE

## 2022-04-25 PROCEDURE — 80053 COMPREHEN METABOLIC PANEL: CPT

## 2022-04-25 RX ORDER — SODIUM CHLORIDE 9 MG/ML
5-40 INJECTION INTRAVENOUS PRN
Status: CANCELLED | OUTPATIENT
Start: 2022-04-25

## 2022-04-25 RX ORDER — ACETAMINOPHEN 325 MG/1
650 TABLET ORAL
Status: CANCELLED | OUTPATIENT
Start: 2022-05-16

## 2022-04-25 RX ORDER — PALONOSETRON 0.05 MG/ML
0.25 INJECTION, SOLUTION INTRAVENOUS ONCE
Status: CANCELLED | OUTPATIENT
Start: 2022-04-25 | End: 2022-04-25

## 2022-04-25 RX ORDER — TRAZODONE HYDROCHLORIDE 50 MG/1
TABLET ORAL
Qty: 30 TABLET | Refills: 3 | Status: SHIPPED | OUTPATIENT
Start: 2022-04-25

## 2022-04-25 RX ORDER — DEXTROSE MONOHYDRATE 50 MG/ML
INJECTION, SOLUTION INTRAVENOUS ONCE
Status: CANCELLED | OUTPATIENT
Start: 2022-04-25 | End: 2022-04-25

## 2022-04-25 RX ORDER — HEPARIN SODIUM (PORCINE) LOCK FLUSH IV SOLN 100 UNIT/ML 100 UNIT/ML
500 SOLUTION INTRAVENOUS PRN
Status: CANCELLED | OUTPATIENT
Start: 2022-04-25

## 2022-04-25 RX ORDER — PALONOSETRON 0.05 MG/ML
0.25 INJECTION, SOLUTION INTRAVENOUS ONCE
Status: COMPLETED | OUTPATIENT
Start: 2022-04-25 | End: 2022-04-25

## 2022-04-25 RX ORDER — FAMOTIDINE 10 MG/ML
20 INJECTION, SOLUTION INTRAVENOUS
Status: CANCELLED | OUTPATIENT
Start: 2022-04-25

## 2022-04-25 RX ORDER — DEXTROSE MONOHYDRATE 50 MG/ML
INJECTION, SOLUTION INTRAVENOUS ONCE
Status: COMPLETED | OUTPATIENT
Start: 2022-04-25 | End: 2022-04-25

## 2022-04-25 RX ORDER — HEPARIN SODIUM (PORCINE) LOCK FLUSH IV SOLN 100 UNIT/ML 100 UNIT/ML
500 SOLUTION INTRAVENOUS PRN
Status: DISCONTINUED | OUTPATIENT
Start: 2022-04-25 | End: 2022-04-26 | Stop reason: HOSPADM

## 2022-04-25 RX ORDER — MEPERIDINE HYDROCHLORIDE 50 MG/ML
12.5 INJECTION INTRAMUSCULAR; INTRAVENOUS; SUBCUTANEOUS PRN
Status: CANCELLED | OUTPATIENT
Start: 2022-04-25

## 2022-04-25 RX ORDER — SODIUM CHLORIDE 9 MG/ML
25 INJECTION, SOLUTION INTRAVENOUS PRN
Status: CANCELLED | OUTPATIENT
Start: 2022-05-16

## 2022-04-25 RX ORDER — ACETAMINOPHEN 325 MG/1
650 TABLET ORAL
Status: CANCELLED | OUTPATIENT
Start: 2022-04-25

## 2022-04-25 RX ORDER — ALBUTEROL SULFATE 90 UG/1
4 AEROSOL, METERED RESPIRATORY (INHALATION) PRN
Status: CANCELLED | OUTPATIENT
Start: 2022-04-25

## 2022-04-25 RX ORDER — SODIUM CHLORIDE 9 MG/ML
INJECTION, SOLUTION INTRAVENOUS CONTINUOUS
Status: CANCELLED | OUTPATIENT
Start: 2022-05-16

## 2022-04-25 RX ORDER — ONDANSETRON 2 MG/ML
8 INJECTION INTRAMUSCULAR; INTRAVENOUS
Status: CANCELLED | OUTPATIENT
Start: 2022-05-16

## 2022-04-25 RX ORDER — SODIUM CHLORIDE 0.9 % (FLUSH) 0.9 %
5-40 SYRINGE (ML) INJECTION PRN
Status: CANCELLED | OUTPATIENT
Start: 2022-05-16

## 2022-04-25 RX ORDER — MEPERIDINE HYDROCHLORIDE 50 MG/ML
12.5 INJECTION INTRAMUSCULAR; INTRAVENOUS; SUBCUTANEOUS PRN
Status: CANCELLED | OUTPATIENT
Start: 2022-05-16

## 2022-04-25 RX ORDER — EPINEPHRINE 1 MG/ML
0.3 INJECTION, SOLUTION, CONCENTRATE INTRAVENOUS PRN
Status: CANCELLED | OUTPATIENT
Start: 2022-04-25

## 2022-04-25 RX ORDER — DIPHENHYDRAMINE HYDROCHLORIDE 50 MG/ML
50 INJECTION INTRAMUSCULAR; INTRAVENOUS
Status: CANCELLED | OUTPATIENT
Start: 2022-05-16

## 2022-04-25 RX ORDER — HEPARIN SODIUM (PORCINE) LOCK FLUSH IV SOLN 100 UNIT/ML 100 UNIT/ML
500 SOLUTION INTRAVENOUS PRN
Status: CANCELLED | OUTPATIENT
Start: 2022-05-16

## 2022-04-25 RX ORDER — DIPHENHYDRAMINE HYDROCHLORIDE 50 MG/ML
50 INJECTION INTRAMUSCULAR; INTRAVENOUS
Status: CANCELLED | OUTPATIENT
Start: 2022-04-25

## 2022-04-25 RX ORDER — SODIUM CHLORIDE 9 MG/ML
25 INJECTION, SOLUTION INTRAVENOUS PRN
Status: CANCELLED | OUTPATIENT
Start: 2022-04-25

## 2022-04-25 RX ORDER — ONDANSETRON 2 MG/ML
8 INJECTION INTRAMUSCULAR; INTRAVENOUS
Status: CANCELLED | OUTPATIENT
Start: 2022-04-25

## 2022-04-25 RX ORDER — EPINEPHRINE 1 MG/ML
0.3 INJECTION, SOLUTION, CONCENTRATE INTRAVENOUS PRN
Status: CANCELLED | OUTPATIENT
Start: 2022-05-16

## 2022-04-25 RX ORDER — FAMOTIDINE 10 MG/ML
20 INJECTION, SOLUTION INTRAVENOUS
Status: CANCELLED | OUTPATIENT
Start: 2022-05-16

## 2022-04-25 RX ORDER — SODIUM CHLORIDE 9 MG/ML
5-40 INJECTION INTRAVENOUS PRN
Status: CANCELLED | OUTPATIENT
Start: 2022-05-16

## 2022-04-25 RX ORDER — SODIUM CHLORIDE 9 MG/ML
INJECTION, SOLUTION INTRAVENOUS CONTINUOUS
Status: CANCELLED | OUTPATIENT
Start: 2022-04-25

## 2022-04-25 RX ORDER — ALBUTEROL SULFATE 90 UG/1
4 AEROSOL, METERED RESPIRATORY (INHALATION) PRN
Status: CANCELLED | OUTPATIENT
Start: 2022-05-16

## 2022-04-25 RX ORDER — PALONOSETRON 0.05 MG/ML
0.25 INJECTION, SOLUTION INTRAVENOUS ONCE
Status: CANCELLED | OUTPATIENT
Start: 2022-05-16 | End: 2022-05-16

## 2022-04-25 RX ORDER — POTASSIUM CHLORIDE 20 MEQ/1
20 TABLET, EXTENDED RELEASE ORAL 2 TIMES DAILY
Qty: 10 TABLET | Refills: 0 | Status: SHIPPED | OUTPATIENT
Start: 2022-04-25 | End: 2022-04-30

## 2022-04-25 RX ORDER — SODIUM CHLORIDE 0.9 % (FLUSH) 0.9 %
5-40 SYRINGE (ML) INJECTION PRN
Status: DISCONTINUED | OUTPATIENT
Start: 2022-04-25 | End: 2022-04-26 | Stop reason: HOSPADM

## 2022-04-25 RX ORDER — SODIUM CHLORIDE 0.9 % (FLUSH) 0.9 %
5-40 SYRINGE (ML) INJECTION PRN
Status: CANCELLED | OUTPATIENT
Start: 2022-04-25

## 2022-04-25 RX ORDER — DEXTROSE MONOHYDRATE 50 MG/ML
INJECTION, SOLUTION INTRAVENOUS ONCE
Status: CANCELLED | OUTPATIENT
Start: 2022-05-16 | End: 2022-05-16

## 2022-04-25 RX ADMIN — OXALIPLATIN 170 MG: 5 INJECTION, SOLUTION INTRAVENOUS at 12:04

## 2022-04-25 RX ADMIN — HEPARIN 500 UNITS: 100 SYRINGE at 14:33

## 2022-04-25 RX ADMIN — PALONOSETRON 0.25 MG: 0.25 INJECTION, SOLUTION INTRAVENOUS at 11:29

## 2022-04-25 RX ADMIN — DEXTROSE MONOHYDRATE: 50 INJECTION, SOLUTION INTRAVENOUS at 12:05

## 2022-04-25 RX ADMIN — SODIUM CHLORIDE, PRESERVATIVE FREE 20 ML: 5 INJECTION INTRAVENOUS at 14:35

## 2022-04-25 RX ADMIN — DEXAMETHASONE SODIUM PHOSPHATE 12 MG: 4 INJECTION INTRA-ARTICULAR; INTRALESIONAL; INTRAMUSCULAR; INTRAVENOUS; SOFT TISSUE at 11:33

## 2022-04-25 NOTE — PROGRESS NOTES
Assisted to infusion area via wheelchair today for chemotherapy, after office visit with Dr. Susan Wallis. Pt states that she is contemplating stopping treatment. And did talk to Dr. Susan Wallis about it. Pt does want to get treatment today. Right chest mediport accessed, positive blood return noted. Labs drawn and sent to lab for processing. Potassium: 3.3. Discussed with Dr. Susan Wallis who recommended that pt take oral potassium supplement 20 meq BID x 5 days. Pt verbalized understanding. Chemo administered as ordered. Call light within reach. Tolerated infusion without incident. Discharge instructions provided.      Status appropriately assessed and documented. All required labs and results reviewed. Treatment approved by provider. Treatment orders and medications verified by 2 Registered Nurses where applicable. Treatment plan was confirmed with patient prior to administration, and educated the need to report any treatment-related symptoms     Prior to administration, when applicable, the following 8 elements of medication administration were reviewed with 2nd Registered Nurse prior to dosing: drug name, drug dose, infusion volume when prepared in a syringe, rate of administration, expiration dates and/or times, appearance and integrity of drug(s), and rate of pump for infusion.  The 5 rights of medication administration have been verified.

## 2022-04-25 NOTE — PROGRESS NOTES
MA Rooming Questions  Patient: Sanju Brady  MRN: [id number]    Date: 4/25/2022        1. Do you have any new issues? Yes - Not eating very much, Nausea, Sometimes has a hard time communicating/getting words together    2. Do you need any refills on medications? yes - Trazodone    3. Have you had any imaging done since your last visit?   no    4. Have you been hospitalized or seen in the emergency room since your last visit here?   no    5. Did the patient have a depression screening completed today?  No    No data recorded     PHQ-9 Given to (if applicable):               PHQ-9 Score (if applicable):                     [] Positive     []  Negative              Does question #9 need addressed (if applicable)                     [] Yes    []  No               Jordan Escalona CMA

## 2022-04-25 NOTE — PROGRESS NOTES
Patient Name:  Ana Campbell  Patient :  1940  Patient MRN:  [id number]     Primary Oncologist: Mohan Mir MD  Referring Provider: Tsering Schafer MD     Date of Service: 2022      Chief Complaint:    Chief Complaint   Patient presents with    Follow-up     Patient Active Problem List:     Hypertension     Obesity     Shortness of breath     Depression     Orthostatic hypotension     Abdominal pain     Anxiety     Chest pain     Ulcer of right lower extremity with fat layer exposed ([address]    HPI:   Ana Campbell is a 72-year-old very pleasant female with medical history significant for hypertension, hyperlipidemia, diabetes mellitus, obesity, COPD, pulmonary hypertension, history is of DVT and PE, initially referred to me on 1/3/2022 for evaluation of her newly diagnosed stage IIIC rectal cancer. She stated that she was initially evaluated by Dr. Armando Elizalde for newly diagnosed tubular adenoma with at least high-grade dysplasia. She was hospitalized on  for abdominal pain. 2021: CT abdomen pelvis: Changes from cholecystectomy. Common bile duct significantly dilated, mild intrahepatic biliary ductal dilatation.      2021: MRI/MRCP: Diffuse biliary ductal dilatation extending to the ampulla, no choledocholithiasis or mass and could represent post cholecystectomy changes. 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 Left renal angiomyolipoma.      09/15/2021: Colonoscopy: Rectosigmoid ulcerated flat mass located approximately 4-5 cm from the dentate line, measuring about 2.5 cm friable, no active bleeding. Biopsies taken and tattoo placed distally. Full colonoscopy completed     Pathology revealed at least high grade dysplasia arising in a tubular adenoma, suspicious for invasion into the lamina propria.    10/25/2021: EUS: A 4 cm posterior rectal wall mass, raised and friable, the mass was mildly ulcerated. Mass appears to involve the submucosa with areas of muscularis propria involvement, consistent with T2, although there were a couple of small focal areas that are concerning but not definitive for invasion through the muscularis propria which raised concern for focal T3 disease. Located about 5 cm from the anal verge on the posterior rectal wall. No obvious lymphadenopathy was noted. EUS staging: T2-T3, N0     Pathology: Invasive moderately differentiated colonic adenocarcinoma. CT scan of the chest with contrast done on 11/5/2021 showed no evidence of intrathoracic metastatic disease.     Since she was found to have clinical T2 rectal cancer, she underwent robotic low anterior resection and permanent colostomy placement on 12/6/2021.      Final pathology was consistent with stage IIIC rectal adenocarcinoma. She was subsequently referred to me for further management. PET CT scan done on 1/20/2022 showed intense uptake at T10 associated with a lytic lesion, suspicious for potential osseous metastasis. Intense uptake in the anorectal region likely due to recent surgery. CT guided biopsy of T10 lesion was done on 2/9/22 and pathology showed bone with chronic inflammation and reactive changes. No malignant cells are seen in this specimen. Discussed her case in tumor board and recommendations were to start adjuvant chemo and to re evaluate with repeat scan in 3 to 4 months. Adjuvant chemotherapy with capecitabine and oxaliplatin was started on 3/14/22. On April 25, 2022, she presented to me for follow-up. I have been following her for stage IIIc rectal adenocarcinoma and she is status post robotic low anterior resection and permanent colostomy placement on 12/6/2021. I reviewed with her findings on PET CT scan and pathology from T10 vertebra body. We looked at her PET CT scan together. Her PET CT scan findings are still concerning for metastatic disease at T10 vertebral, even though biopsy came back negative. Discussed her case in tumor board and recommendations were to start adjuvant chemo and to re evaluate with repeat scan in 3 to 4 months. Adjuvant chemotherapy with capecitabine and oxaliplatin was started on 3/14/22. She is status post second cycle of chemotherapy. She is tolerating chemo fairly okay. She has significant tiredness, fatigue, nausea, loss of appetite and weakness. I decreased the dose of oxaliplatin (from 200 mg to 170 mg) starting from her second cycle. I also decreased the dose of xeloda to 2000 mg in the morning and 1500 mg in the evening (she was on 2000 mg BID) in her second. She still has significant issue with chemo. I recommend her to decrease the dose of xeloda to 1500 mg BID starting from her third cycle which will be started today. I will re evaluate her again after third cycle. Based on how she does in her third cycle, I will consider to further adjust the dose of chemo (oxaliplatin) if needed in her fourth cycle. Nausea - I recommend her to continue with 8 mg TID prn. Will monitor it closely. Hypokalemia - will give oral KCL replacement today. Will monitor K level closely. She doesn't have any other significant symptoms at today visit. Past Medical History:     Significant for  1. Hypertension  2. Hyperlipidemia  3. Diabetes mellitus  4. Obesity  5. History of DVT and pulmonary embolism  6. COPD  7. Pulmonary hypertension    Past Surgery History:    Significant for  1. Cholecystectomy  2. Bilateral knee replacement  3. Tonsillectomy  4. Back surgery x 3  5. Bilateral arm surgery  6. Partial hysterectomy  7. Bilateral foot surgery  8. Low anterior resection for rectal cancer and permanent colostomy placement on 12/6/2021    Social History:   She is a former smoker and she quit smoking in 1994.   She used to quadrant colostomy noted,   MUSCULOSKELETAL: full range of motion noted, tone is normal  NEUROLOGIC: awake, alert, oriented to name, place and time. Motor skills grossly intact. SKIN: appears intact, normal skin color, normal texture, normal turgor, no jaundice.    EXTREMITIES: no clubbing, b/l leg swelling +, no cyanosis, b/l LE edema +,     Labs:  Hematology:  Lab Results   Component Value Date    WBC 5.2 04/25/2022    RBC 4.24 04/25/2022    HGB 12.9 04/25/2022    HCT 40.0 04/25/2022    MCV 94.3 04/25/2022    MCH 30.4 04/25/2022    MCHC 32.3 04/25/2022    RDW 20.6 (H) 04/25/2022     04/25/2022    MPV 9.9 04/25/2022    SEGSPCT 46.8 04/25/2022    EOSRELPCT 1.0 04/25/2022    BASOPCT 1.5 (H) 04/25/2022    LYMPHOPCT 29.5 04/25/2022    MONOPCT 21.2 (H) 04/25/2022    SEGSABS 2.4 04/25/2022    EOSABS 0.1 04/25/2022    BASOSABS 0.1 04/25/2022    LYMPHSABS 1.5 04/25/2022    MONOSABS 1.1 04/25/2022    DIFFTYPE AUTOMATED DIFFERENTIAL 04/25/2022     No results found for: ESR  Chemistry:  Lab Results   Component Value Date     04/25/2022    K 3.0 (L) 04/25/2022     04/04/2022    CO2 26 04/04/2022    BUN 14 04/04/2022    CREATININE 0.7 04/25/2022    GLUCOSE 93 04/04/2022    CALCIUM 9.5 04/04/2022    PROT 6.7 04/04/2022    LABALBU 4.1 04/04/2022    BILITOT 0.5 04/04/2022    ALKPHOS 255 (H) 04/04/2022    AST 28 04/04/2022    ALT 34 04/04/2022    LABGLOM >60 04/25/2022    GFRAA >60 04/25/2022    POCCA 1.24 04/25/2022    POCGLU 131 (H) 04/25/2022     No results found for: MMA, LDH, HOMOCYSTEINE  No components found for: LD  Lab Results   Component Value Date    TSHHS 2.610 10/26/2018    T4FREE 1.10 06/18/2015    FT3 2.4 06/18/2015     Immunology:  Lab Results   Component Value Date    PROT 6.7 04/04/2022     No results found for: TODD Baumann  No results found for: B2M  Coagulation Panel:  Lab Results   Component Value Date    PROTIME 11.7 02/01/2022    INR 0.91 02/01/2022    APTT 23.1 (L) 02/01/2022     Anemia Panel:  Lab Results   Component Value Date    LJQDEKXJ80 0963 (H) 03/09/2013    FOLATE >24.0 03/09/2013     Tumor Markers:  Lab Results   Component Value Date    CEA 2.0 01/03/2022     Observations:  No data recorded     Assessment   Stage IIIC rectal adenocarcinoma    Plan:                                                                                                Torrey Ferrell is a 80-year-old very pleasant female who was initially evaluated by Dr. Eveline Gamboa for newly diagnosed tubular adenoma with at least high-grade dysplasia.      EUS on 10/25/2021 showed a 4 cm posterior rectal wall mass, raised and friable, the mass was mildly ulcerated. Mass appears to involve the submucosa with areas of muscularis propria involvement, consistent with T2, although there were a couple of small focal areas that are concerning but not definitive for invasion through the muscularis propria which raised concern for focal T3 disease. Located about 5 cm from the anal verge on the posterior rectal wall. No obvious lymphadenopathy was noted. EUS staging: T2-T3, N0     Pathology: Invasive moderately differentiated colonic adenocarcinoma     Since she was found to have clinical T2 rectal cancer, she underwent robotic low anterior resection and permanent colostomy placement on 12/6/2021.      Final pathology was consistent with stage IIIC rectal adenocarcinoma. PET CT scan done on 1/20/2022 showed intense uptake at T10 associated with a lytic lesion, suspicious for potential osseous meta stasis. Intense uptake in the anorectal region likely due to recent surgery. CT guided biopsy of T10 lesion was done on 2/9/22 and pathology showed bone with chronic inflammation and reactive changes. No malignant cells are seen in this specimen. Discussed her case in tumor board and recommendations were to start adjuvant chemo and to re evaluate with repeat scan in 3 to 4 months.      Adjuvant chemotherapy with capecitabine and oxaliplatin was started on 3/14/22. On April 25, 2022, she presented to me for follow-up. I have been following her for stage IIIc rectal adenocarcinoma and she is status post robotic low anterior resection and permanent colostomy placement on 12/6/2021. I reviewed with her findings on PET CT scan and pathology from T10 vertebra body. We looked at her PET CT scan together. Her PET CT scan findings are still concerning for metastatic disease at T10 vertebral, even though biopsy came back negative. Discussed her case in tumor board and recommendations were to start adjuvant chemo and to re evaluate with repeat scan in 3 to 4 months. Adjuvant chemotherapy with capecitabine and oxaliplatin was started on 3/14/22. She is status post second cycle of chemotherapy. She is tolerating chemo fairly okay. She has significant tiredness, fatigue, nausea, loss of appetite and weakness. I decreased the dose of oxaliplatin (from 200 mg to 170 mg) starting from her second cycle. I also decreased the dose of xeloda to 2000 mg in the morning and 1500 mg in the evening (she was on 2000 mg BID) in her second. She still has significant issue with chemo. I recommend her to decrease the dose of xeloda to 1500 mg BID starting from her third cycle which will be started today. I will re evaluate her again after third cycle. Based on how she does in her third cycle, I will consider to further adjust the dose of chemo (oxaliplatin) if needed in her fourth cycle. Nausea - I recommend her to continue with 8 mg TID prn. Will monitor it closely. Hypokalemia - will give oral KCL replacement today. Will monitor K level closely. I answered all her questions and concerns for today. I asked her to follow up with primary care physician on regular basis. Recent imaging and labs were reviewed and discussed with the patient.

## 2022-05-06 ENCOUNTER — TELEPHONE (OUTPATIENT)
Dept: INFUSION THERAPY | Age: 82
End: 2022-05-06

## 2022-05-06 NOTE — TELEPHONE ENCOUNTER
Pt's daughter Aung Briceno called to say pt wants to cancel all tx appts & ov as she does not wish to continue with tx. They would like to thank Dr. Alyssa Lugo & all the staff for the care provided. This MA told daughter feel free to call if pt changes her mind or they need anything from our office. Also told her we are sending prayers.

## 2023-11-21 ENCOUNTER — INPATIENT HOSPITAL (OUTPATIENT)
Dept: URBAN - METROPOLITAN AREA HOSPITAL 104 | Facility: HOSPITAL | Age: 83
End: 2023-11-21
Payer: COMMERCIAL

## 2023-11-21 DIAGNOSIS — K80.30 CALCULUS OF BILE DUCT WITH CHOLANGITIS, UNSPECIFIED, WITHOUT: ICD-10-CM

## 2023-11-21 DIAGNOSIS — J96.11 CHRONIC RESPIRATORY FAILURE WITH HYPOXIA: ICD-10-CM

## 2023-11-21 DIAGNOSIS — J18.9 PNEUMONIA, UNSPECIFIED ORGANISM: ICD-10-CM

## 2023-11-21 DIAGNOSIS — J44.9 CHRONIC OBSTRUCTIVE PULMONARY DISEASE, UNSPECIFIED: ICD-10-CM

## 2023-11-21 PROCEDURE — 99222 1ST HOSP IP/OBS MODERATE 55: CPT | Performed by: INTERNAL MEDICINE

## 2023-11-22 PROCEDURE — 43264 ERCP REMOVE DUCT CALCULI: CPT | Performed by: INTERNAL MEDICINE

## 2023-11-22 PROCEDURE — 43274 ERCP DUCT STENT PLACEMENT: CPT | Performed by: INTERNAL MEDICINE

## 2023-11-23 ENCOUNTER — INPATIENT HOSPITAL (OUTPATIENT)
Dept: URBAN - METROPOLITAN AREA HOSPITAL 104 | Facility: HOSPITAL | Age: 83
End: 2023-11-23
Payer: COMMERCIAL

## 2023-11-23 DIAGNOSIS — J96.11 CHRONIC RESPIRATORY FAILURE WITH HYPOXIA: ICD-10-CM

## 2023-11-23 DIAGNOSIS — J18.9 PNEUMONIA, UNSPECIFIED ORGANISM: ICD-10-CM

## 2023-11-23 DIAGNOSIS — K80.30 CALCULUS OF BILE DUCT WITH CHOLANGITIS, UNSPECIFIED, WITHOUT: ICD-10-CM

## 2023-11-23 DIAGNOSIS — J44.9 CHRONIC OBSTRUCTIVE PULMONARY DISEASE, UNSPECIFIED: ICD-10-CM

## 2023-11-23 PROCEDURE — 99232 SBSQ HOSP IP/OBS MODERATE 35: CPT | Mod: FS

## 2023-12-28 ENCOUNTER — ON CAMPUS - OUTPATIENT (OUTPATIENT)
Dept: URBAN - METROPOLITAN AREA HOSPITAL 105 | Facility: HOSPITAL | Age: 83
End: 2023-12-28
Payer: COMMERCIAL

## 2023-12-28 DIAGNOSIS — Z46.59 ENCOUNTER FOR FITTING AND ADJUSTMENT OF OTHER GASTROINTESTIN: ICD-10-CM

## 2023-12-28 DIAGNOSIS — K80.50 CALCULUS OF BILE DUCT WITHOUT CHOLANGITIS OR CHOLECYSTITIS W: ICD-10-CM

## 2023-12-28 PROCEDURE — 43275 ERCP REMOVE FORGN BODY DUCT: CPT | Performed by: INTERNAL MEDICINE

## 2023-12-28 PROCEDURE — 43264 ERCP REMOVE DUCT CALCULI: CPT | Performed by: INTERNAL MEDICINE

## 2024-02-27 ENCOUNTER — HOSPITAL ENCOUNTER (OUTPATIENT)
Age: 84
Setting detail: SPECIMEN
Discharge: HOME | End: 2024-02-27
Payer: COMMERCIAL

## 2024-02-27 PROCEDURE — 88305 TISSUE EXAM BY PATHOLOGIST: CPT | Performed by: PATHOLOGY

## 2024-02-27 PROCEDURE — 88341 IMHCHEM/IMCYTCHM EA ADD ANTB: CPT | Performed by: PATHOLOGY

## 2024-02-27 PROCEDURE — 88342 IMHCHEM/IMCYTCHM 1ST ANTB: CPT | Performed by: PATHOLOGY

## 2024-05-09 ENCOUNTER — HOSPITAL ENCOUNTER (OUTPATIENT)
Age: 84
Setting detail: SPECIMEN
Discharge: HOME | End: 2024-05-09
Payer: COMMERCIAL

## 2024-05-09 LAB
ALBUMIN SERPL-MCNC: 3.4 GM/DL (ref 3.4–5)
ALP BLD-CCNC: 83 IU/L (ref 40–129)
ALT SERPL-CCNC: 6 U/L (ref 10–40)
ANION GAP SERPL CALCULATED.3IONS-SCNC: 9 MMOL/L (ref 7–16)
AST SERPL-CCNC: 13 IU/L (ref 15–37)
BASOPHILS ABSOLUTE: 0 K/CU MM
BASOPHILS RELATIVE PERCENT: 0.2 % (ref 0–1)
BILIRUB SERPL-MCNC: 0.3 MG/DL (ref 0–1)
BUN SERPL-MCNC: 9 MG/DL (ref 6–23)
CALCIUM SERPL-MCNC: 8.7 MG/DL (ref 8.3–10.6)
CHLORIDE BLD-SCNC: 100 MMOL/L (ref 99–110)
CO2: 31 MMOL/L (ref 21–32)
CREAT SERPL-MCNC: 0.5 MG/DL (ref 0.6–1.1)
DIFFERENTIAL TYPE: ABNORMAL
EOSINOPHILS ABSOLUTE: 0.2 K/CU MM
EOSINOPHILS RELATIVE PERCENT: 3.6 % (ref 0–3)
GFR, ESTIMATED: >90 ML/MIN/1.73M2
GLUCOSE SERPL-MCNC: 93 MG/DL (ref 70–99)
HCT VFR BLD CALC: 31.5 % (ref 37–47)
HEMOGLOBIN: 9.4 GM/DL (ref 12.5–16)
IMMATURE NEUTROPHIL %: 2.6 % (ref 0–0.43)
LYMPHOCYTES ABSOLUTE: 1.4 K/CU MM
LYMPHOCYTES RELATIVE PERCENT: 26.8 % (ref 24–44)
MCH RBC QN AUTO: 26.7 PG (ref 27–31)
MCHC RBC AUTO-ENTMCNC: 29.8 % (ref 32–36)
MCV RBC AUTO: 89.5 FL (ref 78–100)
MONOCYTES ABSOLUTE: 1.1 K/CU MM
MONOCYTES RELATIVE PERCENT: 20.8 % (ref 0–4)
NEUTROPHILS ABSOLUTE: 2.3 K/CU MM
NEUTROPHILS RELATIVE PERCENT: 46 % (ref 36–66)
NUCLEATED RBC %: 0 %
PDW BLD-RTO: 16 % (ref 11.7–14.9)
PLATELET # BLD: 216 K/CU MM (ref 140–440)
PMV BLD AUTO: 10.4 FL (ref 7.5–11.1)
POTASSIUM SERPL-SCNC: 4 MMOL/L (ref 3.5–5.1)
RBC # BLD: 3.52 M/CU MM (ref 4.2–5.4)
SODIUM BLD-SCNC: 140 MMOL/L (ref 135–145)
TOTAL IMMATURE NEUTOROPHIL: 0.13 K/CU MM
TOTAL NUCLEATED RBC: 0 K/CU MM
TOTAL PROTEIN: 5.8 GM/DL (ref 6.4–8.2)
TOTAL RETICULOCYTE COUNT: 0.09 K/CU MM
WBC # BLD: 5 K/CU MM (ref 4–10.5)

## 2024-05-09 PROCEDURE — 85025 COMPLETE CBC W/AUTO DIFF WBC: CPT

## 2024-05-09 PROCEDURE — 36415 COLL VENOUS BLD VENIPUNCTURE: CPT

## 2024-05-09 PROCEDURE — 80053 COMPREHEN METABOLIC PANEL: CPT

## 2024-05-17 ENCOUNTER — HOSPITAL ENCOUNTER (OUTPATIENT)
Age: 84
Setting detail: SPECIMEN
Discharge: HOME | End: 2024-05-17
Payer: COMMERCIAL

## 2024-05-17 LAB
ALBUMIN SERPL-MCNC: 3.9 GM/DL (ref 3.4–5)
ALP BLD-CCNC: 101 IU/L (ref 40–128)
ALT SERPL-CCNC: 7 U/L (ref 10–40)
ANION GAP SERPL CALCULATED.3IONS-SCNC: 13 MMOL/L (ref 7–16)
AST SERPL-CCNC: 18 IU/L (ref 15–37)
BASOPHILS ABSOLUTE: 0 K/CU MM
BASOPHILS RELATIVE PERCENT: 0.3 % (ref 0–1)
BILIRUB SERPL-MCNC: 0.3 MG/DL (ref 0–1)
BUN SERPL-MCNC: 9 MG/DL (ref 6–23)
CALCIUM SERPL-MCNC: 9.1 MG/DL (ref 8.3–10.6)
CHLORIDE BLD-SCNC: 101 MMOL/L (ref 99–110)
CO2: 28 MMOL/L (ref 21–32)
CREAT SERPL-MCNC: 0.5 MG/DL (ref 0.6–1.1)
DIFFERENTIAL TYPE: ABNORMAL
EOSINOPHILS ABSOLUTE: 0.2 K/CU MM
EOSINOPHILS RELATIVE PERCENT: 3.4 % (ref 0–3)
GFR, ESTIMATED: >90 ML/MIN/1.73M2
GLUCOSE SERPL-MCNC: 91 MG/DL (ref 70–99)
HCT VFR BLD CALC: 36.8 % (ref 37–47)
HEMOGLOBIN: 10.9 GM/DL (ref 12.5–16)
IMMATURE NEUTROPHIL %: 1.5 % (ref 0–0.43)
LYMPHOCYTES ABSOLUTE: 2.1 K/CU MM
LYMPHOCYTES RELATIVE PERCENT: 34.3 % (ref 24–44)
MCH RBC QN AUTO: 26.5 PG (ref 27–31)
MCHC RBC AUTO-ENTMCNC: 29.6 % (ref 32–36)
MCV RBC AUTO: 89.5 FL (ref 78–100)
MONOCYTES ABSOLUTE: 1.1 K/CU MM
MONOCYTES RELATIVE PERCENT: 17.1 % (ref 0–4)
NEUTROPHILS ABSOLUTE: 2.7 K/CU MM
NEUTROPHILS RELATIVE PERCENT: 43.4 % (ref 36–66)
NUCLEATED RBC %: 0 %
PDW BLD-RTO: 15.7 % (ref 11.7–14.9)
PLATELET # BLD: 245 K/CU MM (ref 140–440)
PMV BLD AUTO: 10.5 FL (ref 7.5–11.1)
POTASSIUM SERPL-SCNC: 4.1 MMOL/L (ref 3.5–5.1)
PRO-BNP: 127.4 PG/ML
PROCALCITONIN SERPL-MCNC: 0.02 NG/ML
RBC # BLD: 4.11 M/CU MM (ref 4.2–5.4)
SODIUM BLD-SCNC: 142 MMOL/L (ref 135–145)
TOTAL IMMATURE NEUTOROPHIL: 0.09 K/CU MM
TOTAL NUCLEATED RBC: 0 K/CU MM
TOTAL PROTEIN: 7.3 GM/DL (ref 6.4–8.2)
WBC # BLD: 6.1 K/CU MM (ref 4–10.5)

## 2024-05-17 PROCEDURE — 36415 COLL VENOUS BLD VENIPUNCTURE: CPT

## 2024-05-17 PROCEDURE — 84145 PROCALCITONIN (PCT): CPT

## 2024-05-17 PROCEDURE — 83880 ASSAY OF NATRIURETIC PEPTIDE: CPT

## 2024-05-17 PROCEDURE — 80053 COMPREHEN METABOLIC PANEL: CPT

## 2024-05-17 PROCEDURE — 85025 COMPLETE CBC W/AUTO DIFF WBC: CPT

## 2025-01-02 ENCOUNTER — HOSPITAL ENCOUNTER (OUTPATIENT)
Age: 85
Setting detail: SPECIMEN
Discharge: HOME | End: 2025-01-02

## 2025-01-02 LAB
ANION GAP SERPL CALCULATED.3IONS-SCNC: 8 MMOL/L (ref 9–17)
BASOPHILS # BLD: 0.01 K/UL
BASOPHILS NFR BLD: 0 % (ref 0–1)
BUN SERPL-MCNC: 7 MG/DL (ref 7–20)
CALCIUM SERPL-MCNC: 9.2 MG/DL (ref 8.3–10.6)
CHLORIDE SERPL-SCNC: 101 MMOL/L (ref 99–110)
CO2 SERPL-SCNC: 32 MMOL/L (ref 21–32)
CREAT SERPL-MCNC: 0.4 MG/DL (ref 0.6–1.2)
EOSINOPHIL # BLD: 0.07 K/UL
EOSINOPHILS RELATIVE PERCENT: 1 % (ref 0–3)
ERYTHROCYTE [DISTWIDTH] IN BLOOD BY AUTOMATED COUNT: 13.8 % (ref 11.7–14.9)
GFR, ESTIMATED: >90 ML/MIN/1.73M2
GLUCOSE SERPL-MCNC: 118 MG/DL (ref 74–99)
HCT VFR BLD AUTO: 35.7 % (ref 37–47)
HGB BLD-MCNC: 11.3 G/DL (ref 12.5–16)
IMM GRANULOCYTES # BLD AUTO: 0.27 K/UL
IMM GRANULOCYTES NFR BLD: 4 %
LYMPHOCYTES NFR BLD: 1.51 K/UL
LYMPHOCYTES RELATIVE PERCENT: 24 % (ref 24–44)
MAGNESIUM SERPL-MCNC: 2 MG/DL (ref 1.8–2.4)
MCH RBC QN AUTO: 28.8 PG (ref 27–31)
MCHC RBC AUTO-ENTMCNC: 31.7 G/DL (ref 32–36)
MCV RBC AUTO: 91.1 FL (ref 78–100)
MONOCYTES NFR BLD: 0.91 K/UL
MONOCYTES NFR BLD: 14 % (ref 0–4)
NEUTROPHILS NFR BLD: 57 % (ref 36–66)
NEUTS SEG NFR BLD: 3.64 K/UL
PHOSPHATE SERPL-MCNC: 2 MG/DL (ref 2.5–4.9)
PLATELET, FLUORESCENCE: 138 K/UL (ref 140–440)
PMV BLD AUTO: 10.7 FL (ref 7.5–11.1)
POTASSIUM SERPL-SCNC: 3.6 MMOL/L (ref 3.5–5.1)
RBC # BLD AUTO: 3.92 M/UL (ref 4.2–5.4)
SODIUM SERPL-SCNC: 140 MMOL/L (ref 136–145)
WBC OTHER # BLD: 6.4 K/UL (ref 4–10.5)

## 2025-01-02 PROCEDURE — 84100 ASSAY OF PHOSPHORUS: CPT

## 2025-01-02 PROCEDURE — 80048 BASIC METABOLIC PNL TOTAL CA: CPT

## 2025-01-02 PROCEDURE — 83735 ASSAY OF MAGNESIUM: CPT

## 2025-01-02 PROCEDURE — 85025 COMPLETE CBC W/AUTO DIFF WBC: CPT
